# Patient Record
Sex: MALE | Race: WHITE | NOT HISPANIC OR LATINO | Employment: OTHER | URBAN - METROPOLITAN AREA
[De-identification: names, ages, dates, MRNs, and addresses within clinical notes are randomized per-mention and may not be internally consistent; named-entity substitution may affect disease eponyms.]

---

## 2017-02-07 ENCOUNTER — TELEPHONE (OUTPATIENT)
Dept: ORTHOPEDICS | Facility: CLINIC | Age: 67
End: 2017-02-07

## 2017-02-07 DIAGNOSIS — M54.50 LUMBAR SPINE PAIN: Primary | ICD-10-CM

## 2017-02-10 ENCOUNTER — TELEPHONE (OUTPATIENT)
Dept: ORTHOPEDICS | Facility: CLINIC | Age: 67
End: 2017-02-10

## 2017-02-10 NOTE — TELEPHONE ENCOUNTER
Left message regarding appt on 2/14/17 with Mia Mckeon PA-C. Pt should arrive on the 2nd floor at 1p for xrays, then up to floor 5 to see Mia following xrays. Phone number was provided for any further questions.

## 2017-02-14 ENCOUNTER — HOSPITAL ENCOUNTER (OUTPATIENT)
Dept: RADIOLOGY | Facility: HOSPITAL | Age: 67
Discharge: HOME | End: 2017-02-14
Attending: ORTHOPAEDIC SURGERY
Payer: MEDICARE

## 2017-02-14 ENCOUNTER — INITIAL CONSULT (OUTPATIENT)
Dept: ORTHOPEDICS | Facility: CLINIC | Age: 67
End: 2017-02-14
Payer: MEDICARE

## 2017-02-14 VITALS
HEART RATE: 74 BPM | HEIGHT: 70 IN | SYSTOLIC BLOOD PRESSURE: 102 MMHG | BODY MASS INDEX: 30.27 KG/M2 | WEIGHT: 211.44 LBS | DIASTOLIC BLOOD PRESSURE: 61 MMHG

## 2017-02-14 DIAGNOSIS — M51.36 DDD (DEGENERATIVE DISC DISEASE), LUMBAR: Primary | ICD-10-CM

## 2017-02-14 DIAGNOSIS — M54.50 LUMBAR SPINE PAIN: ICD-10-CM

## 2017-02-14 PROCEDURE — 1125F AMNT PAIN NOTED PAIN PRSNT: CPT | Mod: S$GLB,,, | Performed by: PHYSICIAN ASSISTANT

## 2017-02-14 PROCEDURE — 72120 X-RAY BEND ONLY L-S SPINE: CPT | Mod: 26,,, | Performed by: RADIOLOGY

## 2017-02-14 PROCEDURE — 3074F SYST BP LT 130 MM HG: CPT | Mod: S$GLB,,, | Performed by: PHYSICIAN ASSISTANT

## 2017-02-14 PROCEDURE — 99214 OFFICE O/P EST MOD 30 MIN: CPT | Mod: S$GLB,,, | Performed by: PHYSICIAN ASSISTANT

## 2017-02-14 PROCEDURE — 1160F RVW MEDS BY RX/DR IN RCRD: CPT | Mod: S$GLB,,, | Performed by: PHYSICIAN ASSISTANT

## 2017-02-14 PROCEDURE — 72100 X-RAY EXAM L-S SPINE 2/3 VWS: CPT | Mod: 26,,, | Performed by: RADIOLOGY

## 2017-02-14 PROCEDURE — 1157F ADVNC CARE PLAN IN RCRD: CPT | Mod: S$GLB,,, | Performed by: PHYSICIAN ASSISTANT

## 2017-02-14 PROCEDURE — 3078F DIAST BP <80 MM HG: CPT | Mod: S$GLB,,, | Performed by: PHYSICIAN ASSISTANT

## 2017-02-14 PROCEDURE — 99999 PR PBB SHADOW E&M-EST. PATIENT-LVL IV: CPT | Mod: PBBFAC,,, | Performed by: PHYSICIAN ASSISTANT

## 2017-02-14 PROCEDURE — 1159F MED LIST DOCD IN RCRD: CPT | Mod: S$GLB,,, | Performed by: PHYSICIAN ASSISTANT

## 2017-02-14 NOTE — MR AVS SNAPSHOT
[facility]  [address]  [facility] [address]  Phone: [phone]                  Moshe Murphy   2017 1:30 PM   Initial consult    Description:  Male : 1950   Provider:  Mia Mckeon PA-C   Department:  [facility] [place]           Reason for Visit     Lower Back - Pain           Diagnoses this Visit        Comments    DDD (degenerative disc disease), lumbar    -  Primary            To Do List           Goals (5 Years of Data)     None      [facility]     [facility] On [facility] Nurse Care Line -  Assistance  Registered nurses in the [facility] Center provide clinical advisement, health education, appointment booking, and other advisory services.  Call for this free service at [phone].             Medications           Message regarding Medications     Verify the changes and/or additions to your medication regime listed below are the same as discussed with your clinician today.  If any of these changes or additions are incorrect, please notify your healthcare provider.             Verify that the below list of medications is an accurate representation of the medications you are currently taking.  If none reported, the list may be blank. If incorrect, please contact your healthcare provider. Carry this list with you in case of emergency.           Current Medications     aspirin 81 MG Chew Take 81 mg by mouth once daily.    atorvastatin (LIPITOR) 10 MG tablet Take 10 mg by mouth once daily.    atorvastatin (LIPITOR) 20 MG tablet 20 mg once daily.     CRESTOR 10 mg tablet Take 10 mg by mouth once daily.    diazepam (VALIUM) 2 MG tablet Take 2 mg by mouth every 6 (six) hours as needed for Anxiety. Take 2 mg 1/2 hour before MRI  May repeat this dose twice      Disp. #3    levothyroxine (SYNTHROID) 50 MCG tablet Take 25 mcg by mouth once daily.    losartan (COZAAR) 100 MG tablet Take 100 mg by mouth once daily.    meloxicam (MOBIC) 15 MG tablet  "TAKE 1 TABLET BY MOUTH ONCE DAILY    NEXIUM 40 mg capsule     oxycodone-acetaminophen (PERCOCET) 5-325 mg per tablet Take 1 tablet by mouth every 4 (four) hours as needed for Pain.    pantoprazole (PROTONIX) 40 MG tablet Take 40 mg by mouth once daily.    testosterone cypionate (DEPOTESTOTERONE CYPIONATE) 200 mg/mL injection            Clinical Reference Information           Your Vitals Were     BP Pulse Height Weight BMI    102/61 74 5' 10" (1.778 m) 95.9 kg (211 lb 6.7 oz) 30.34 kg/m2      Blood Pressure          Most Recent Value    BP  102/61      Allergies as of 2/14/2017     No Known Allergies      Immunizations Administered on Date of Encounter - 2/14/2017     None      Orders Placed During Today's Visit     Future Labs/Procedures Expected by Expires    MRI Lumbar Spine Without Contrast  2/14/2017 2/14/2018      [facility] Sign-Up     Activating your [facility] account is as easy as 1-2-3!     1) Visit [email or web address], select Sign Up Now, enter this activation code and your date of birth, then select Next.  [email or web address]  Expires: 3/31/2017  1:35 PM      2) Create a username and password to use when you visit [facility] in the future and select a security question in case you lose your password and select Next.    3) Enter your e-mail address and click Sign Up!    Additional Information  If you have questions, please e-mail [email or web address] or call [phone] to talk to our [facility] staff. Remember, [facility] is NOT to be used for urgent needs. For medical emergencies, dial 911.         Language Assistance Services     ATTENTION: Language assistance services are available, free of charge. Please call [phone].      ATENCIÓN: Si habla shanthi, tiene a rivera disposición servicios gratuitos de asistencia lingüística. Llame al [phone].     CHÚ Ý: N?u b?n nói Ti?ng Vi?t, có các d?ch v? h? tr? ngôn ng? mi?n phí dành cho b?n. G?i s? [phone].         [facility] complies with " applicable Federal civil rights laws and does not discriminate on the basis of race, color, national origin, age, disability, or sex.

## 2017-02-14 NOTE — PROGRESS NOTES
DATE: 2/14/2017  PATIENT: Moshe Murphy    Supervising Physician: Moi Lobo M.D.    CHIEF COMPLAINT: back pain    HISTORY:  Moshe Murphy is a 66 y.o. 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 The pain has been present for about 3 years but has progressively worsened over the last month. The patient describes the pain as tight.  The pain is worse with walking and sitting and improved by laying down. There is no associated numbness and tingling. There is no subjective weakness. Prior treatments have included mobic, muscle relaxants, percocet and home exercises, but no ESIs or surgery.    The patient denies myelopathic symptoms such as handwriting changes or difficulty with buttons/coins/keys. Denies perineal paresthesias, bowel/bladder dysfunction.    PAST MEDICAL/SURGICAL HISTORY:  Past Medical History   Diagnosis Date    Acid reflux     Hyperlipidemia     Hypertension      Past Surgical History   Procedure Laterality Date    Stents      Elbow surgery      Shoulder surgery      Knee arthroscopy         Medications:   Current Outpatient Prescriptions on File Prior to Visit   Medication Sig Dispense Refill    aspirin 81 MG Chew Take 81 mg by mouth once daily.      atorvastatin (LIPITOR) 10 MG tablet Take 10 mg by mouth once daily.      atorvastatin (LIPITOR) 20 MG tablet 20 mg once daily.       CRESTOR 10 mg tablet Take 10 mg by mouth once daily.  3    diazepam (VALIUM) 2 MG tablet Take 2 mg by mouth every 6 (six) hours as needed for Anxiety. Take 2 mg 1/2 hour before MRI  May repeat this dose twice      Disp. #3      levothyroxine (SYNTHROID) 50 MCG tablet Take 25 mcg by mouth once daily.  1    losartan (COZAAR) 100 MG tablet Take 100 mg by mouth once daily.  6    meloxicam (MOBIC) 15 MG tablet TAKE 1 TABLET BY MOUTH ONCE DAILY 90 tablet 2    NEXIUM 40 mg capsule       oxycodone-acetaminophen (PERCOCET)  "5-325 mg per tablet Take 1 tablet by mouth every 4 (four) hours as needed for Pain. 20 tablet 0    pantoprazole (PROTONIX) 40 MG tablet Take 40 mg by mouth once daily.      testosterone cypionate (DEPOTESTOTERONE CYPIONATE) 200 mg/mL injection   3     No current facility-administered medications on file prior to visit.        Social History:   Social History     Social History    Marital status:      Spouse name: N/A    Number of children: N/A    Years of education: N/A     Occupational History    Not on file.     Social History Main Topics    Smoking status: Never Smoker    Smokeless tobacco: Never Used    Alcohol use Yes      Comment: socially    Drug use: No    Sexual activity: Yes     Partners: Female     Other Topics Concern    Not on file     Social History Narrative       REVIEW OF SYSTEMS:  Constitution: Negative. Negative for chills, fever and night sweats.   Cardiovascular: Negative for chest pain and syncope.   Respiratory: Negative for cough and shortness of breath.   Gastrointestinal: See HPI. Negative for nausea/vomiting. Negative for abdominal pain.  Genitourinary: See HPI. Negative for discoloration or dysuria.  Skin: Negative for dry skin, itching and rash.   Hematologic/Lymphatic: Negative for bleeding problem. Does not bruise/bleed easily.   Musculoskeletal: Negative for falls and muscle weakness.   Neurological: See HPI. No seizures.   Endocrine: Negative for polydipsia, polyphagia and polyuria.   Allergic/Immunologic: Negative for hives and persistent infections.     EXAM:  Visit Vitals    /61    Pulse 74    Ht 5' 10" (1.778 m)    Wt 95.9 kg (211 lb 6.7 oz)    BMI 30.34 kg/m2       General: The patient is a very pleasant 66 y.o. male in no apparent distress, the patient is oriented to person, place and time.  Psych: Normal mood and affect  HEENT: Vision grossly intact, hearing intact to the spoken word.  Lungs: Respirations unlabored.  Gait: Normal station and gait, " no difficulty with toe or heel walk.   Skin: Dorsal lumbar skin negative for rashes, lesions, hairy patches and surgical scars. There is mild lumbar tenderness to palpation.  Range of motion: Lumbar range of motion is acceptable.  Spinal Balance: Global saggital and coronal spinal balance acceptable, not significant for scoliosis and kyphosis.  Musculoskeletal: No pain with the range of motion of the bilateral hips. No trochanteric tenderness to palpation.  Vascular: Bilateral lower extremities warm and well perfused, dorsalis pedis pulses 2+ bilaterally.  Neurological: Normal strength and tone in all major motor groups in the bilateral lower extremities. Normal sensation to light touch in the L2-S1 dermatomes bilaterally.  Deep tendon reflexes symmetric 1+ in the bilateral lower extremities.  Negative Babinski bilaterally. Straight leg raise positive bilaterally, reproduces back pain.    IMAGING:      Today I personally reviewed AP, Lat and Flex/Ex  upright L-spine films that demonstrate mild to moderate disc space narrowing at L4/5 and L5/S1.     ASSESSMENT/PLAN:    Moshe was seen today for pain.    Diagnoses and all orders for this visit:    DDD (degenerative disc disease), lumbar  -     MRI Lumbar Spine Without Contrast; Future    The patient is having back and buttock pain that has failed to respond to conservative treatment including medications and exercises.  MRI lumbar spine for further evaluation.  Follow up after the MRI to discuss results and further treatment including ESIs and possibly surgery.       Return if symptoms worsen or fail to improve.

## 2017-02-20 ENCOUNTER — HOSPITAL ENCOUNTER (OUTPATIENT)
Dept: RADIOLOGY | Facility: HOSPITAL | Age: 67
Discharge: HOME | End: 2017-02-20
Attending: ORTHOPAEDIC SURGERY
Payer: MEDICARE

## 2017-02-20 DIAGNOSIS — M51.36 DDD (DEGENERATIVE DISC DISEASE), LUMBAR: ICD-10-CM

## 2017-02-24 ENCOUNTER — HOSPITAL ENCOUNTER (OUTPATIENT)
Dept: RADIOLOGY | Facility: HOSPITAL | Age: 67
Discharge: HOME | End: 2017-02-24
Attending: ORTHOPAEDIC SURGERY
Payer: MEDICARE

## 2017-02-24 PROCEDURE — 72148 MRI LUMBAR SPINE W/O DYE: CPT | Mod: 26,,, | Performed by: RADIOLOGY

## 2017-02-24 PROCEDURE — 63600175 PHARM REV CODE 636 W HCPCS: Performed by: STUDENT IN AN ORGANIZED HEALTH CARE EDUCATION/TRAINING PROGRAM

## 2017-02-24 PROCEDURE — 72148 MRI LUMBAR SPINE W/O DYE: CPT | Mod: TC

## 2017-02-24 RX ORDER — MIDAZOLAM HYDROCHLORIDE 1 MG/ML
2 INJECTION INTRAMUSCULAR; INTRAVENOUS ONCE
Status: COMPLETED | OUTPATIENT
Start: 2017-02-24 | End: 2017-02-24

## 2017-02-24 RX ADMIN — MIDAZOLAM HYDROCHLORIDE 2 MG: 1 INJECTION, SOLUTION INTRAMUSCULAR; INTRAVENOUS at 12:02

## 2017-02-24 NOTE — PROGRESS NOTES
2 mg versed given prior to MRI start for anxiolysis.  Patient denies allergies.  Patient verified ride home with wife.  Care of patient left in hands of MRI staff.

## 2017-03-08 ENCOUNTER — OFFICE VISIT (OUTPATIENT)
Dept: ORTHOPEDICS | Facility: CLINIC | Age: 67
End: 2017-03-08
Payer: MEDICARE

## 2017-03-08 VITALS
DIASTOLIC BLOOD PRESSURE: 73 MMHG | WEIGHT: 214.31 LBS | HEIGHT: 70 IN | BODY MASS INDEX: 30.68 KG/M2 | SYSTOLIC BLOOD PRESSURE: 125 MMHG | HEART RATE: 74 BPM

## 2017-03-08 DIAGNOSIS — M48.00 SPINAL STENOSIS, UNSPECIFIED SPINAL REGION: Primary | ICD-10-CM

## 2017-03-08 PROCEDURE — 1160F RVW MEDS BY RX/DR IN RCRD: CPT | Mod: S$GLB,,, | Performed by: PHYSICIAN ASSISTANT

## 2017-03-08 PROCEDURE — 1125F AMNT PAIN NOTED PAIN PRSNT: CPT | Mod: S$GLB,,, | Performed by: PHYSICIAN ASSISTANT

## 2017-03-08 PROCEDURE — 3074F SYST BP LT 130 MM HG: CPT | Mod: S$GLB,,, | Performed by: PHYSICIAN ASSISTANT

## 2017-03-08 PROCEDURE — 99999 PR PBB SHADOW E&M-EST. PATIENT-LVL III: CPT | Mod: PBBFAC,,, | Performed by: PHYSICIAN ASSISTANT

## 2017-03-08 PROCEDURE — 99213 OFFICE O/P EST LOW 20 MIN: CPT | Mod: S$GLB,,, | Performed by: PHYSICIAN ASSISTANT

## 2017-03-08 PROCEDURE — 1157F ADVNC CARE PLAN IN RCRD: CPT | Mod: S$GLB,,, | Performed by: PHYSICIAN ASSISTANT

## 2017-03-08 PROCEDURE — 1159F MED LIST DOCD IN RCRD: CPT | Mod: S$GLB,,, | Performed by: PHYSICIAN ASSISTANT

## 2017-03-08 PROCEDURE — 3078F DIAST BP <80 MM HG: CPT | Mod: S$GLB,,, | Performed by: PHYSICIAN ASSISTANT

## 2017-03-08 RX ORDER — AZITHROMYCIN 250 MG/1
TABLET, FILM COATED ORAL
Refills: 0 | COMMUNITY
Start: 2016-12-28 | End: 2019-06-11

## 2017-03-08 NOTE — PROGRESS NOTES
"DATE: 3/8/2017  PATIENT: Moshe Murphy    Attending Physician: Moi Lobo M.D.    HISTORY:  Moshe Murphy is a 66 y.o. male who returns to me today for MRI results.  He was last seen by me 2/14/2017.  Today he is doing well but notes continued back and buttock pain.    The Patient denies myelopathic symptoms such as handwriting changes or difficulty with buttons/coins/keys. Denies perineal paresthesias, bowel/bladder dysfunction.    PMH/PSH/FamHx/SocHx:  Unchanged from prior visit    ROS:  REVIEW OF SYSTEMS:  Constitution: Negative. Negative for chills, fever and night sweats.   HENT: Negative for congestion and headaches.    Eyes: Negative for blurred vision, left vision loss and right vision loss.   Cardiovascular: Negative for chest pain and syncope.   Respiratory: Negative for cough and shortness of breath.    Endocrine: Negative for polydipsia, polyphagia and polyuria.   Hematologic/Lymphatic: Negative for bleeding problem. Does not bruise/bleed easily.   Skin: Negative for dry skin, itching and rash.   Musculoskeletal: Negative for falls and muscle weakness.   Gastrointestinal: Negative for abdominal pain and bowel incontinence.   Allergic/Immunologic: Negative for hives and persistent infections.  Genitourinary: Negative for urinary retention/incontinence and nocturia.   Neurological: Negative for disturbances in coordination, no myelopathic symptoms such as handwriting changes or difficulty with buttons, coins, keys or small objects. No loss of balance and seizures.   Psychiatric/Behavioral: Negative for depression. The patient does not have insomnia.   Denies perineal paresthesias, bowel or bladder incontinence    EXAM:  /73 (BP Location: Right arm, Patient Position: Sitting, BP Method: Automatic)  Pulse 74  Ht 5' 10" (1.778 m)  Wt 97.2 kg (214 lb 4.6 oz)  BMI 30.75 kg/m2    My physical examination was notable for the following findings:     Normal station and gait, no " difficulty with toe or heel walk.   Dorsal lumbar skin negative for rashes, lesions, hairy patches and surgical scars. There is mild lumbar tenderness to palpation.  Lumbar range of motion is acceptable.  Global saggital and coronal spinal balance acceptable, not significant for scoliosis and kyphosis.  No pain with the range of motion of the bilateral hips. No trochanteric tenderness to palpation.  Bilateral lower extremities warm and well perfused, dorsalis pedis pulses 2+ bilaterally.  Normal strength and tone in all major motor groups in the bilateral lower extremities. Normal sensation to light touch in the L2-S1 dermatomes bilaterally.  Deep tendon reflexes symmetric 1+ in the bilateral lower extremities.  Negative Babinski bilaterally. Straight leg raise positive bilaterally, reproduces back pain.      IMAGING:  No new imaging today.    Today I personally re- reviewed AP, Lat and Flex/Ex  upright L-spine that demonstrate mild to moderate disc space narrowing at L4/5 and L5/S1.     MRI lumbar spine demonstrates a broad based disc bulge with moderate spinal stenosis at L3/4, L4/5 and L5/S1.     ASSESSMENT/PLAN:    Diagnoses and all orders for this visit:    Spinal stenosis, unspecified spinal region  -     Procedure Order to [facility]; Future      The patient would like to try an injection.  Plan for caudal TFESI.  Follow up after injection if symptoms persist.    Return if symptoms worsen or fail to improve.

## 2017-03-31 ENCOUNTER — HOSPITAL ENCOUNTER (OUTPATIENT)
Facility: OTHER | Age: 67
Discharge: HOME | End: 2017-03-31
Attending: ANESTHESIOLOGY | Admitting: ANESTHESIOLOGY
Payer: MEDICARE

## 2017-03-31 ENCOUNTER — SURGERY (OUTPATIENT)
Age: 67
End: 2017-03-31

## 2017-03-31 VITALS
OXYGEN SATURATION: 98 % | HEART RATE: 56 BPM | SYSTOLIC BLOOD PRESSURE: 128 MMHG | RESPIRATION RATE: 17 BRPM | TEMPERATURE: 98 F | BODY MASS INDEX: 30.78 KG/M2 | HEIGHT: 70 IN | WEIGHT: 215 LBS | DIASTOLIC BLOOD PRESSURE: 75 MMHG

## 2017-03-31 DIAGNOSIS — M54.17 LUMBOSACRAL RADICULOPATHY: ICD-10-CM

## 2017-03-31 DIAGNOSIS — G89.29 CHRONIC PAIN: ICD-10-CM

## 2017-03-31 DIAGNOSIS — M51.36 DDD (DEGENERATIVE DISC DISEASE), LUMBAR: Primary | ICD-10-CM

## 2017-03-31 PROCEDURE — 62323 NJX INTERLAMINAR LMBR/SAC: CPT | Performed by: ANESTHESIOLOGY

## 2017-03-31 PROCEDURE — 62322 NJX INTERLAMINAR LMBR/SAC: CPT | Performed by: ANESTHESIOLOGY

## 2017-03-31 PROCEDURE — 62327 NJX INTERLAMINAR LMBR/SAC: CPT | Mod: ,,, | Performed by: ANESTHESIOLOGY

## 2017-03-31 PROCEDURE — 63600175 PHARM REV CODE 636 W HCPCS: Performed by: ANESTHESIOLOGY

## 2017-03-31 PROCEDURE — 77003 FLUOROGUIDE FOR SPINE INJECT: CPT | Performed by: ANESTHESIOLOGY

## 2017-03-31 PROCEDURE — 25000003 PHARM REV CODE 250: Performed by: ANESTHESIOLOGY

## 2017-03-31 PROCEDURE — 25500020 PHARM REV CODE 255: Performed by: ANESTHESIOLOGY

## 2017-03-31 PROCEDURE — 99152 MOD SED SAME PHYS/QHP 5/>YRS: CPT | Mod: ,,, | Performed by: ANESTHESIOLOGY

## 2017-03-31 RX ORDER — FENTANYL CITRATE 50 UG/ML
INJECTION, SOLUTION INTRAMUSCULAR; INTRAVENOUS
Status: DISCONTINUED | OUTPATIENT
Start: 2017-03-31 | End: 2017-03-31 | Stop reason: HOSPADM

## 2017-03-31 RX ORDER — MIDAZOLAM HYDROCHLORIDE 1 MG/ML
INJECTION INTRAMUSCULAR; INTRAVENOUS
Status: DISCONTINUED | OUTPATIENT
Start: 2017-03-31 | End: 2017-03-31 | Stop reason: HOSPADM

## 2017-03-31 RX ORDER — LIDOCAINE HYDROCHLORIDE 10 MG/ML
INJECTION INFILTRATION; PERINEURAL
Status: DISCONTINUED | OUTPATIENT
Start: 2017-03-31 | End: 2017-03-31 | Stop reason: HOSPADM

## 2017-03-31 RX ORDER — SODIUM CHLORIDE 9 MG/ML
500 INJECTION, SOLUTION INTRAVENOUS CONTINUOUS
Status: DISCONTINUED | OUTPATIENT
Start: 2017-03-31 | End: 2017-03-31 | Stop reason: HOSPADM

## 2017-03-31 RX ORDER — LIDOCAINE HYDROCHLORIDE 5 MG/ML
INJECTION, SOLUTION INFILTRATION; INTRAVENOUS
Status: DISCONTINUED | OUTPATIENT
Start: 2017-03-31 | End: 2017-03-31 | Stop reason: HOSPADM

## 2017-03-31 RX ORDER — TRIAMCINOLONE ACETONIDE 40 MG/ML
INJECTION, SUSPENSION INTRA-ARTICULAR; INTRAMUSCULAR
Status: DISCONTINUED | OUTPATIENT
Start: 2017-03-31 | End: 2017-03-31 | Stop reason: HOSPADM

## 2017-03-31 RX ADMIN — LIDOCAINE HYDROCHLORIDE 10 ML: 10 INJECTION, SOLUTION INFILTRATION; PERINEURAL at 09:03

## 2017-03-31 RX ADMIN — FENTANYL CITRATE 50 MCG: 50 INJECTION, SOLUTION INTRAMUSCULAR; INTRAVENOUS at 09:03

## 2017-03-31 RX ADMIN — TRIAMCINOLONE ACETONIDE 40 MG: 40 INJECTION, SUSPENSION INTRA-ARTICULAR; INTRAMUSCULAR at 09:03

## 2017-03-31 RX ADMIN — IOHEXOL 5 ML: 300 INJECTION, SOLUTION INTRAVENOUS at 09:03

## 2017-03-31 RX ADMIN — SODIUM CHLORIDE 500 ML: 0.9 INJECTION, SOLUTION INTRAVENOUS at 08:03

## 2017-03-31 RX ADMIN — MIDAZOLAM HYDROCHLORIDE 1 MG: 1 INJECTION, SOLUTION INTRAMUSCULAR; INTRAVENOUS at 09:03

## 2017-03-31 RX ADMIN — LIDOCAINE HYDROCHLORIDE 10 ML: 5 INJECTION, SOLUTION INFILTRATION; INTRAVENOUS at 09:03

## 2017-03-31 NOTE — OP NOTE
Patient Name: Moshe Murphy  MRN: [id number]    INFORMED CONSENT: The procedure, risks, benefits and options were discussed with patient. There are no contraindications to the procedure. The patient expressed understanding and agreed to proceed. The personnel performing the procedure was discussed. I verify that I personally obtained Moshe's consent prior to the start of the procedure and the signed consent can be found on the patient's chart.    Procedure Date: 03/31/2017    Anesthesia: Topical    Pre Procedure diagnosis:   1. DDD (degenerative disc disease), lumbar    2. Lumbosacral radiculopathy    3. Chronic pain      Post-Procedure diagnosis: same      Moderate Sedation: Yes - Fentanyl 50 mcg and Midazolam 2 mg    PROCEDURE: CAUDAL HAYLIE with Racz Catheter      DESCRIPTION OF PROCEDURE: After fully informed written consent was obtained, the patient was brought to the procedure room and placed in the prone position. Monitoring of pulse oximetry, heart rate, and blood pressure was done pre-procedure, during the procedure, and post-procedure. The skin was prepped with chlorhexidine three times and draped in a sterile fashion.  After performing time out. With a 25-gauge 1.5  inch needle, 4 cc of lidocaine 1% was injected subcutaneously over the entry site. The sacrum and sacral cornua were visualized in an AP view.  An 16 gauge 31/2 inch Tuohy needle was inserted and advanced into the sacral hiatus under fluoroscopic guidance. After the needle passed through the sacrococcygeal ligament, the needle angle was lowered and the needle was advanced . The needle position was confirmed using AP and lateral fluoroscopic imaging. There was no evidence of paresthesias throughout needle placement. A radiopaque 20 G Flextip catheter  was introduced through the needle and directed to the L5 level, under fluoroscopic guidance. The stylette was removed and aspiration was negative for blood or CSF. 3 ml of Omnipaque 300 contrast  agent was slowly injected. Confirmation of spread of contrast agent within the epidural space was made with fluoroscopic imaging in the AP and lateral views. Subsequently, 10 mL of lidocaine 0.5% and 40 mg Kenalog was slowly administered without resistance. There was no pain on injection. Contrast spread was noted from S2 to L5. The needle was removed and bleeding was nil . The patient tolerated the procedure well and there was no evidence of procedural complications. A sterile dressing was applied. No  specimens collected. Moshe was taken back to the recovery room for further observation.     Blood Loss: Nill  Specimen: None    Ryan James MD

## 2017-03-31 NOTE — IP AVS SNAPSHOT
[facility] Location ([facility])  [address] [place], [place] [id number]  Phone: [phone]           Patient Discharge Instructions   Our goal is to set you up for success. This packet includes information on your condition, medications, and your home care.  It will help you care for yourself to prevent having to return to the hospital.     Please ask your nurse if you have any questions.      There are many details to remember when preparing to leave the hospital. Here is what you will need to do:    1. Take your medicine. If you are prescribed medications, review your Medication List on the following pages. You may have new medications to  at the pharmacy and others that you'll need to stop taking. Review the instructions for how and when to take your medications. Talk with your doctor or nurses if you are unsure of what to do.     2. Go to your follow-up appointments. Specific follow-up information is listed in the following pages. Your may be contacted by a nurse or clinical provider about future appointments. Be sure we have all of the phone numbers to reach you. Please contact your provider's office if you are unable to make an appointment.     3. Watch for warning signs. Your doctor or nurse will give you detailed warning signs to watch for and when to call for assistance. These instructions may also include educational information about your condition. If you experience any of warning signs to your health, call your doctor.           [facility]  Unless otherwise directed by your provider, please   contact [facility], our nurse care line   that is available for 24/7 assistance.     [phone] (toll-free)     Registered nurses in the [facility]   provide: appointment scheduling, clinical advisement, health education, and other advisory services.                  ** Verify the list of medication(s) below is accurate and up to date. Carry this with you in case of  emergency. If your medications have changed, please notify your healthcare provider.             Medication List      ASK your doctor about these medications        Additional Info                      aspirin 81 MG Chew   Refills:  0   Dose:  81 mg    Instructions:  Take 81 mg by mouth once daily.     Begin Date    AM    Noon    PM    Bedtime       * atorvastatin 10 MG tablet   Commonly known as:  LIPITOR   Refills:  0   Dose:  10 mg    Instructions:  Take 10 mg by mouth once daily.     Begin Date    AM    Noon    PM    Bedtime       * atorvastatin 20 MG tablet   Commonly known as:  LIPITOR   Refills:  0   Dose:  20 mg    Instructions:  20 mg once daily.     Begin Date    AM    Noon    PM    Bedtime       azithromycin 250 MG tablet   Commonly known as:  Z-SHERRY   Refills:  0      Begin Date    AM    Noon    PM    Bedtime       CRESTOR 10 MG tablet   Refills:  3   Dose:  10 mg   Generic drug:  rosuvastatin    Instructions:  Take 10 mg by mouth once daily.     Begin Date    AM    Noon    PM    Bedtime       diazePAM 2 MG tablet   Commonly known as:  VALIUM   Refills:  0   Dose:  2 mg    Instructions:  Take 2 mg by mouth every 6 (six) hours as needed for Anxiety. Take 2 mg 1/2 hour before MRI  May repeat this dose twice      Disp. #3     Begin Date    AM    Noon    PM    Bedtime       levothyroxine 50 MCG tablet   Commonly known as:  SYNTHROID   Refills:  1   Dose:  25 mcg    Instructions:  Take 25 mcg by mouth once daily.     Begin Date    AM    Noon    PM    Bedtime       losartan 100 MG tablet   Commonly known as:  COZAAR   Refills:  6   Dose:  100 mg    Instructions:  Take 100 mg by mouth once daily.     Begin Date    AM    Noon    PM    Bedtime       meloxicam 15 MG tablet   Commonly known as:  MOBIC   Quantity:  90 tablet   Refills:  2   Comments:  **Patient requests 90 days supply**    Instructions:  TAKE 1 TABLET BY MOUTH ONCE DAILY     Begin Date    AM    Noon    PM    Bedtime       NEXIUM 40 MG capsule    Refills:  0   Generic drug:  esomeprazole      Begin Date    AM    Noon    PM    Bedtime       oxycodone-acetaminophen 5-325 mg per tablet   Commonly known as:  PERCOCET   Quantity:  20 tablet   Refills:  0   Dose:  1 tablet    Instructions:  Take 1 tablet by mouth every 4 (four) hours as needed for Pain.     Begin Date    AM    Noon    PM    Bedtime       pantoprazole 40 MG tablet   Commonly known as:  PROTONIX   Refills:  0   Dose:  40 mg    Instructions:  Take 40 mg by mouth once daily.     Begin Date    AM    Noon    PM    Bedtime       testosterone cypionate 200 mg/mL injection   Commonly known as:  DEPOTESTOTERONE CYPIONATE   Refills:  3      Begin Date    AM    Noon    PM    Bedtime       * Notice:  This list has 2 medication(s) that are the same as other medications prescribed for you. Read the directions carefully, and ask your doctor or other care provider to review them with you.               Please bring to all follow up appointments:    1. A copy of your discharge instructions.  2. All medicines you are currently taking in their original bottles.  3. Identification and insurance card.    Please arrive 15 minutes ahead of scheduled appointment time.    Please call 24 hours in advance if you must reschedule your appointment and/or time.            Discharge Instructions       Home Care Instructions Pain Management:    1. DIET:   You may resume your normal diet today.   2. BATHING:   You may shower with luke warm water. No soaking in tub.  3. DRESSING:   You may remove your bandage today.   4. ACTIVITY LEVEL:   You may resume your normal activities 24 hrs after your procedure.  5. MEDICATIONS:   You may resume your normal medications today.   6. SPECIAL INSTRUCTIONS:   No heat to the injection site for 24 hrs including, bath or shower, heating pad, moist heat, or hot tubs.    Use ice pack to injection site for any pain or discomfort.  Apply ice packs for 20 minute intervals as needed.   If you have  "received any sedatives by mouth today you may not drive for 12 hours.    If you have received any sedation through your IV, you may not drive for 24 hrs.     PLEASE CALL YOUR DOCTOR IF:  1. Redness or swelling around the injection site.  2. Fever of 101 degrees  3. Drainage (pus) from the injection site.  4. For any continuous bleeding (some dried blood over the incision is normal.)  5. For severe headache that is relieved when lying flat.    FOR EMERGENCIES:   If any unusual problems or difficulties occur during clinic hours, call [phone] or 487.         Admission Information     Date & Time Provider Department CSN    3/31/2017  7:41 AM Ryan James MD [facility] [id number]      Care Providers     Provider Role Specialty Primary office phone    Ryan James MD Attending Provider Pain Medicine [phone]    Ryan James MD Surgeon  Pain Medicine [phone]      Your Vitals Were     BP Pulse Temp Resp Height Weight    127/73 (BP Location: Right arm, Patient Position: Sitting, BP Method: Automatic) 55 97.7 °F (36.5 °C) (Oral) 17 5' 10" (1.778 m) 97.5 kg (215 lb)    SpO2 BMI             96% 30.85 kg/m2         Recent Lab Values     No lab values to display.      Allergies as of 3/31/2017     No Known Allergies      Advance Directives     An advance directive is a document which, in the event you are no longer able to make decisions for yourself, tells your healthcare team what kind of treatment you do or do not want to receive, or who you would like to make those decisions for you.  If you do not currently have an advance directive, [facility] encourages you to create one.  For more information call:  [phone]), [phone] ([phone]),  or log on to [email or web address].        Language Assistance Services     ATTENTION: Language assistance services are available, free of charge. Please call [phone].      ATENCIÓN: Si amanda brady " disposición servicios gratuitos de asistencia lingüística. Majo al [phone].     [facility] Ý: N?u b?n nói Ti?ng Vi?t, có các d?ch v? h? tr? ngôn ng? mi?n phí dành cho b?n. G?i s? [phone].        [facility] Sign-Up     Activating your [facility] account is as easy as 1-2-3!     1) Visit [email or web address], select Sign Up Now, enter this activation code and your date of birth, then select Next.  [email or web address]  Expires: 3/31/2017  2:35 PM      2) Create a username and password to use when you visit [facility] in the future and select a security question in case you lose your password and select Next.    3) Enter your e-mail address and click Sign Up!    Additional Information  If you have questions, please e-mail [email or web address] or call [phone] to talk to our [facility] staff. Remember, [facility] is NOT to be used for urgent needs. For medical emergencies, dial 911.          [facility] complies with applicable Federal civil rights laws and does not discriminate on the basis of race, color, national origin, age, disability, or sex.

## 2017-03-31 NOTE — DISCHARGE SUMMARY
Discharge Note  Short Stay      SUMMARY     Admit Date: 3/31/2017    Attending Physician: Ryan James      Discharge Physician: Ryan James      Discharge Date: 3/31/2017 9:18 AM    Final Diagnosis:   1. DDD (degenerative disc disease), lumbar    2. Lumbosacral radiculopathy    3. Chronic pain        Disposition: Home or self care    Patient Instructions:   Current Discharge Medication List      CONTINUE these medications which have NOT CHANGED    Details   aspirin 81 MG Chew Take 81 mg by mouth once daily.      !! atorvastatin (LIPITOR) 10 MG tablet Take 10 mg by mouth once daily.      !! atorvastatin (LIPITOR) 20 MG tablet 20 mg once daily.       CRESTOR 10 mg tablet Take 10 mg by mouth once daily.  Refills: 3      levothyroxine (SYNTHROID) 50 MCG tablet Take 25 mcg by mouth once daily.  Refills: 1      losartan (COZAAR) 100 MG tablet Take 100 mg by mouth once daily.  Refills: 6      NEXIUM 40 mg capsule       pantoprazole (PROTONIX) 40 MG tablet Take 40 mg by mouth once daily.      azithromycin (Z-SHERRY) 250 MG tablet Refills: 0      diazepam (VALIUM) 2 MG tablet Take 2 mg by mouth every 6 (six) hours as needed for Anxiety. Take 2 mg 1/2 hour before MRI  May repeat this dose twice      Disp. #3      meloxicam (MOBIC) 15 MG tablet TAKE 1 TABLET BY MOUTH ONCE DAILY  Qty: 90 tablet, Refills: 2    Comments: **Patient requests 90 days supply**      oxycodone-acetaminophen (PERCOCET) 5-325 mg per tablet Take 1 tablet by mouth every 4 (four) hours as needed for Pain.  Qty: 20 tablet, Refills: 0      testosterone cypionate (DEPOTESTOTERONE CYPIONATE) 200 mg/mL injection Refills: 3       !! - Potential duplicate medications found. Please discuss with provider.              Discharge Diagnosis: Same as Pre and Post Procedure  Condition on Discharge: Stable.  Diet on Discharge: Same as before.  Activity: as per instruction sheet.  Discharge to: Home with a responsible adult.  Follow up: as per Discharge  instructions.

## 2017-03-31 NOTE — H&P (VIEW-ONLY)
"DATE: 3/8/2017  PATIENT: Moshe Murphy    Attending Physician: Moi Lobo M.D.    HISTORY:  Moshe Murphy is a 66 y.o. male who returns to me today for MRI results.  He was last seen by me 2/14/2017.  Today he is doing well but notes continued back and buttock pain.    The Patient denies myelopathic symptoms such as handwriting changes or difficulty with buttons/coins/keys. Denies perineal paresthesias, bowel/bladder dysfunction.    PMH/PSH/FamHx/SocHx:  Unchanged from prior visit    ROS:  REVIEW OF SYSTEMS:  Constitution: Negative. Negative for chills, fever and night sweats.   HENT: Negative for congestion and headaches.    Eyes: Negative for blurred vision, left vision loss and right vision loss.   Cardiovascular: Negative for chest pain and syncope.   Respiratory: Negative for cough and shortness of breath.    Endocrine: Negative for polydipsia, polyphagia and polyuria.   Hematologic/Lymphatic: Negative for bleeding problem. Does not bruise/bleed easily.   Skin: Negative for dry skin, itching and rash.   Musculoskeletal: Negative for falls and muscle weakness.   Gastrointestinal: Negative for abdominal pain and bowel incontinence.   Allergic/Immunologic: Negative for hives and persistent infections.  Genitourinary: Negative for urinary retention/incontinence and nocturia.   Neurological: Negative for disturbances in coordination, no myelopathic symptoms such as handwriting changes or difficulty with buttons, coins, keys or small objects. No loss of balance and seizures.   Psychiatric/Behavioral: Negative for depression. The patient does not have insomnia.   Denies perineal paresthesias, bowel or bladder incontinence    EXAM:  /73 (BP Location: Right arm, Patient Position: Sitting, BP Method: Automatic)  Pulse 74  Ht 5' 10" (1.778 m)  Wt 97.2 kg (214 lb 4.6 oz)  BMI 30.75 kg/m2    My physical examination was notable for the following findings:     Normal station and gait, no " difficulty with toe or heel walk.   Dorsal lumbar skin negative for rashes, lesions, hairy patches and surgical scars. There is mild lumbar tenderness to palpation.  Lumbar range of motion is acceptable.  Global saggital and coronal spinal balance acceptable, not significant for scoliosis and kyphosis.  No pain with the range of motion of the bilateral hips. No trochanteric tenderness to palpation.  Bilateral lower extremities warm and well perfused, dorsalis pedis pulses 2+ bilaterally.  Normal strength and tone in all major motor groups in the bilateral lower extremities. Normal sensation to light touch in the L2-S1 dermatomes bilaterally.  Deep tendon reflexes symmetric 1+ in the bilateral lower extremities.  Negative Babinski bilaterally. Straight leg raise positive bilaterally, reproduces back pain.      IMAGING:  No new imaging today.    Today I personally re- reviewed AP, Lat and Flex/Ex  upright L-spine that demonstrate mild to moderate disc space narrowing at L4/5 and L5/S1.     MRI lumbar spine demonstrates a broad based disc bulge with moderate spinal stenosis at L3/4, L4/5 and L5/S1.     ASSESSMENT/PLAN:    Diagnoses and all orders for this visit:    Spinal stenosis, unspecified spinal region  -     Procedure Order to [facility]; Future      The patient would like to try an injection.  Plan for caudal TFESI.  Follow up after injection if symptoms persist.    Return if symptoms worsen or fail to improve.

## 2017-03-31 NOTE — DISCHARGE INSTRUCTIONS

## 2017-04-18 ENCOUNTER — OFFICE VISIT (OUTPATIENT)
Dept: ORTHOPEDICS | Facility: CLINIC | Age: 67
End: 2017-04-18
Payer: MEDICARE

## 2017-04-18 VITALS — HEIGHT: 70 IN | BODY MASS INDEX: 30.73 KG/M2 | WEIGHT: 214.63 LBS

## 2017-04-18 DIAGNOSIS — M16.11 PRIMARY OSTEOARTHRITIS OF RIGHT HIP: Primary | ICD-10-CM

## 2017-04-18 DIAGNOSIS — M48.00 SPINAL STENOSIS, UNSPECIFIED SPINAL REGION: ICD-10-CM

## 2017-04-18 PROCEDURE — 1125F AMNT PAIN NOTED PAIN PRSNT: CPT | Mod: S$GLB,,, | Performed by: PHYSICIAN ASSISTANT

## 2017-04-18 PROCEDURE — 99999 PR PBB SHADOW E&M-EST. PATIENT-LVL III: CPT | Mod: PBBFAC,,, | Performed by: PHYSICIAN ASSISTANT

## 2017-04-18 PROCEDURE — 1159F MED LIST DOCD IN RCRD: CPT | Mod: S$GLB,,, | Performed by: PHYSICIAN ASSISTANT

## 2017-04-18 PROCEDURE — 1160F RVW MEDS BY RX/DR IN RCRD: CPT | Mod: S$GLB,,, | Performed by: PHYSICIAN ASSISTANT

## 2017-04-18 PROCEDURE — 99213 OFFICE O/P EST LOW 20 MIN: CPT | Mod: S$GLB,,, | Performed by: PHYSICIAN ASSISTANT

## 2017-04-18 PROCEDURE — 1157F ADVNC CARE PLAN IN RCRD: CPT | Mod: 8P,S$GLB,, | Performed by: PHYSICIAN ASSISTANT

## 2017-04-18 NOTE — PROGRESS NOTES
"DATE: 4/18/2017  PATIENT: Moshe Murphy    Attending Physician: Moi Lobo M.D.    HISTORY:  Moshe Murphy is a 66 y.o. male who returns to me today for follow up after having an HAYLIE 3/31/2017.  He was last seen by me 3/8/2017.  Today he is doing well but notes the injection gave great relief of his back pain but now he has right groin pain.  He was seen by [facility] for his hip in September 2016.  He says he is ready for an injection in the hip.     The Patient denies myelopathic symptoms such as handwriting changes or difficulty with buttons/coins/keys. Denies perineal paresthesias, bowel/bladder dysfunction.    PMH/PSH/FamHx/SocHx:  Unchanged from prior visit    ROS:  REVIEW OF SYSTEMS:  Constitution: Negative. Negative for chills, fever and night sweats.   HENT: Negative for congestion and headaches.    Eyes: Negative for blurred vision, left vision loss and right vision loss.   Cardiovascular: Negative for chest pain and syncope.   Respiratory: Negative for cough and shortness of breath.    Endocrine: Negative for polydipsia, polyphagia and polyuria.   Hematologic/Lymphatic: Negative for bleeding problem. Does not bruise/bleed easily.   Skin: Negative for dry skin, itching and rash.   Musculoskeletal: Negative for falls and muscle weakness.   Gastrointestinal: Negative for abdominal pain and bowel incontinence.   Allergic/Immunologic: Negative for hives and persistent infections.  Genitourinary: Negative for urinary retention/incontinence and nocturia.   Neurological: Negative for disturbances in coordination, no myelopathic symptoms such as handwriting changes or difficulty with buttons, coins, keys or small objects. No loss of balance and seizures.   Psychiatric/Behavioral: Negative for depression. The patient does not have insomnia.   Denies perineal paresthesias, bowel or bladder incontinence    EXAM:  Ht 5' 10" (1.778 m)  Wt 97.3 kg (214 lb 9.9 oz)  BMI 30.79 kg/m2    My physical " examination was notable for the following findings:     Normal station and gait, no difficulty with toe or heel walk.   Dorsal lumbar skin negative for rashes, lesions, hairy patches and surgical scars. There is minimal lumbar tenderness to palpation.  Lumbar range of motion is acceptable.  Global saggital and coronal spinal balance acceptable, not significant for scoliosis and kyphosis.  No pain with the range of motion of the bilateral hips. No trochanteric tenderness to palpation.  Stinchfield's positive.  Negative log roll.   Bilateral lower extremities warm and well perfused, dorsalis pedis pulses 2+ bilaterally.  Normal strength and tone in all major motor groups in the bilateral lower extremities. Normal sensation to light touch in the L2-S1 dermatomes bilaterally.  Deep tendon reflexes symmetric 2+ in the bilateral lower extremities.  Negative Babinski bilaterally. Straight leg raise negative bilaterally.      IMAGING:  No new imaging today.    Today I personally re- reviewed AP, Lat and Flex/Ex  upright L-spine that demonstrate mild to moderate disc space narrowing at L4/5 and L5/S1.      MRI lumbar spine demonstrates a broad based disc bulge with moderate spinal stenosis at L3/4, L4/5 and L5/S1.     MRI and xray of the right hip shows mild degenerative changes.       ASSESSMENT/PLAN:    Diagnoses and all orders for this visit:    Primary osteoarthritis of right hip  -     Procedure Order to [facility]; Future    Spinal stenosis, unspecified spinal region      The patient is having primarily right groin pain.  His back pain is significant improved since having the HAYLIE.  He would like to try an injection in the right hip.  Order placed today.  He will follow up with me after the injection to re-evaluate.    No Follow-up on file.

## 2017-04-19 ENCOUNTER — TELEPHONE (OUTPATIENT)
Dept: PAIN MEDICINE | Facility: CLINIC | Age: 67
End: 2017-04-19

## 2017-04-19 NOTE — TELEPHONE ENCOUNTER
Left voice message asking for a return call to schedule for Right Hip joint injection with Dr. James, referred by Dr. Lobo.

## 2017-04-28 ENCOUNTER — SURGERY (OUTPATIENT)
Age: 67
End: 2017-04-28

## 2017-04-28 ENCOUNTER — HOSPITAL ENCOUNTER (OUTPATIENT)
Facility: OTHER | Age: 67
Discharge: HOME | End: 2017-04-28
Attending: ANESTHESIOLOGY | Admitting: ANESTHESIOLOGY
Payer: MEDICARE

## 2017-04-28 VITALS
TEMPERATURE: 98 F | OXYGEN SATURATION: 95 % | SYSTOLIC BLOOD PRESSURE: 148 MMHG | WEIGHT: 215 LBS | DIASTOLIC BLOOD PRESSURE: 79 MMHG | HEIGHT: 70 IN | HEART RATE: 64 BPM | BODY MASS INDEX: 30.78 KG/M2 | RESPIRATION RATE: 18 BRPM

## 2017-04-28 DIAGNOSIS — M25.559 CHRONIC HIP PAIN, UNSPECIFIED LATERALITY: ICD-10-CM

## 2017-04-28 DIAGNOSIS — M16.9 OSTEOARTHRITIS OF HIP, UNSPECIFIED LATERALITY, UNSPECIFIED OSTEOARTHRITIS TYPE: Primary | ICD-10-CM

## 2017-04-28 DIAGNOSIS — G89.29 CHRONIC PAIN: ICD-10-CM

## 2017-04-28 DIAGNOSIS — G89.29 CHRONIC HIP PAIN, UNSPECIFIED LATERALITY: ICD-10-CM

## 2017-04-28 PROCEDURE — 20610 DRAIN/INJ JOINT/BURSA W/O US: CPT | Performed by: ANESTHESIOLOGY

## 2017-04-28 PROCEDURE — 77002 NEEDLE LOCALIZATION BY XRAY: CPT | Performed by: ANESTHESIOLOGY

## 2017-04-28 PROCEDURE — 25000003 PHARM REV CODE 250: Performed by: ANESTHESIOLOGY

## 2017-04-28 PROCEDURE — 20610 DRAIN/INJ JOINT/BURSA W/O US: CPT | Mod: RT,,, | Performed by: ANESTHESIOLOGY

## 2017-04-28 PROCEDURE — 63600175 PHARM REV CODE 636 W HCPCS: Performed by: ANESTHESIOLOGY

## 2017-04-28 PROCEDURE — 25500020 PHARM REV CODE 255: Performed by: ANESTHESIOLOGY

## 2017-04-28 PROCEDURE — 77002 NEEDLE LOCALIZATION BY XRAY: CPT | Mod: 26,59,RT, | Performed by: ANESTHESIOLOGY

## 2017-04-28 RX ORDER — LIDOCAINE HYDROCHLORIDE AND EPINEPHRINE 10; 10 MG/ML; UG/ML
INJECTION, SOLUTION INFILTRATION; PERINEURAL
Status: DISCONTINUED | OUTPATIENT
Start: 2017-04-28 | End: 2017-04-28 | Stop reason: HOSPADM

## 2017-04-28 RX ORDER — BUPIVACAINE HYDROCHLORIDE 2.5 MG/ML
INJECTION, SOLUTION EPIDURAL; INFILTRATION; INTRACAUDAL
Status: DISCONTINUED | OUTPATIENT
Start: 2017-04-28 | End: 2017-04-28 | Stop reason: HOSPADM

## 2017-04-28 RX ORDER — TRIAMCINOLONE ACETONIDE 40 MG/ML
INJECTION, SUSPENSION INTRA-ARTICULAR; INTRAMUSCULAR
Status: DISCONTINUED | OUTPATIENT
Start: 2017-04-28 | End: 2017-04-28 | Stop reason: HOSPADM

## 2017-04-28 RX ORDER — ALPRAZOLAM 0.5 MG/1
0.5 TABLET, ORALLY DISINTEGRATING ORAL
Status: COMPLETED | OUTPATIENT
Start: 2017-04-28 | End: 2017-04-28

## 2017-04-28 RX ADMIN — ALPRAZOLAM 0.5 MG: 0.5 TABLET, ORALLY DISINTEGRATING ORAL at 01:04

## 2017-04-28 RX ADMIN — LIDOCAINE HYDROCHLORIDE AND EPINEPHRINE 1 ML: 10; 10 INJECTION, SOLUTION INFILTRATION; PERINEURAL at 02:04

## 2017-04-28 RX ADMIN — TRIAMCINOLONE ACETONIDE 40 MG: 40 INJECTION, SUSPENSION INTRA-ARTICULAR; INTRAMUSCULAR at 02:04

## 2017-04-28 RX ADMIN — IOHEXOL 5 ML: 300 INJECTION, SOLUTION INTRAVENOUS at 02:04

## 2017-04-28 RX ADMIN — BUPIVACAINE HYDROCHLORIDE 10 ML: 2.5 INJECTION, SOLUTION EPIDURAL; INFILTRATION; INTRACAUDAL; PERINEURAL at 02:04

## 2017-04-28 NOTE — IP AVS SNAPSHOT
[facility] Location ([facility])  [address] [place], [place] [address]  Phone: [phone]           Patient Discharge Instructions   Our goal is to set you up for success. This packet includes information on your condition, medications, and your home care.  It will help you care for yourself to prevent having to return to the hospital.     Please ask your nurse if you have any questions.      There are many details to remember when preparing to leave the hospital. Here is what you will need to do:    1. Take your medicine. If you are prescribed medications, review your Medication List on the following pages. You may have new medications to  at the pharmacy and others that you'll need to stop taking. Review the instructions for how and when to take your medications. Talk with your doctor or nurses if you are unsure of what to do.     2. Go to your follow-up appointments. Specific follow-up information is listed in the following pages. Your may be contacted by a nurse or clinical provider about future appointments. Be sure we have all of the phone numbers to reach you. Please contact your provider's office if you are unable to make an appointment.     3. Watch for warning signs. Your doctor or nurse will give you detailed warning signs to watch for and when to call for assistance. These instructions may also include educational information about your condition. If you experience any of warning signs to your health, call your doctor.           [facility]  Unless otherwise directed by your provider, please   contact [facility], our nurse care line   that is available for 24/7 assistance.     [phone] (toll-free)     Registered nurses in the [facility]   provide: appointment scheduling, clinical advisement, health education, and other advisory services.                  ** Verify the list of medication(s) below is accurate and up to date. Carry this with you in case of  emergency. If your medications have changed, please notify your healthcare provider.             Medication List      ASK your doctor about these medications        Additional Info                      aspirin 81 MG Chew   Refills:  0   Dose:  81 mg    Instructions:  Take 81 mg by mouth once daily.     Begin Date    AM    Noon    PM    Bedtime       * atorvastatin 10 MG tablet   Commonly known as:  LIPITOR   Refills:  0   Dose:  10 mg    Instructions:  Take 10 mg by mouth once daily.     Begin Date    AM    Noon    PM    Bedtime       * atorvastatin 20 MG tablet   Commonly known as:  LIPITOR   Refills:  0   Dose:  20 mg    Instructions:  20 mg once daily.     Begin Date    AM    Noon    PM    Bedtime       azithromycin 250 MG tablet   Commonly known as:  Z-SHERRY   Refills:  0      Begin Date    AM    Noon    PM    Bedtime       CRESTOR 10 MG tablet   Refills:  3   Dose:  10 mg   Generic drug:  rosuvastatin    Instructions:  Take 10 mg by mouth once daily.     Begin Date    AM    Noon    PM    Bedtime       diazePAM 2 MG tablet   Commonly known as:  VALIUM   Refills:  0   Dose:  2 mg    Instructions:  Take 2 mg by mouth every 6 (six) hours as needed for Anxiety. Take 2 mg 1/2 hour before MRI  May repeat this dose twice      Disp. #3     Begin Date    AM    Noon    PM    Bedtime       levothyroxine 50 MCG tablet   Commonly known as:  SYNTHROID   Refills:  1   Dose:  25 mcg    Instructions:  Take 25 mcg by mouth once daily.     Begin Date    AM    Noon    PM    Bedtime       losartan 100 MG tablet   Commonly known as:  COZAAR   Refills:  6   Dose:  100 mg    Instructions:  Take 100 mg by mouth once daily.     Begin Date    AM    Noon    PM    Bedtime       meloxicam 15 MG tablet   Commonly known as:  MOBIC   Quantity:  90 tablet   Refills:  2   Comments:  **Patient requests 90 days supply**    Instructions:  TAKE 1 TABLET BY MOUTH ONCE DAILY     Begin Date    AM    Noon    PM    Bedtime       NEXIUM 40 MG capsule    Refills:  0   Generic drug:  esomeprazole      Begin Date    AM    Noon    PM    Bedtime       oxycodone-acetaminophen 5-325 mg per tablet   Commonly known as:  PERCOCET   Quantity:  20 tablet   Refills:  0   Dose:  1 tablet    Instructions:  Take 1 tablet by mouth every 4 (four) hours as needed for Pain.     Begin Date    AM    Noon    PM    Bedtime       pantoprazole 40 MG tablet   Commonly known as:  PROTONIX   Refills:  0   Dose:  40 mg    Instructions:  Take 40 mg by mouth once daily.     Begin Date    AM    Noon    PM    Bedtime       testosterone cypionate 200 mg/mL injection   Commonly known as:  DEPOTESTOTERONE CYPIONATE   Refills:  3      Begin Date    AM    Noon    PM    Bedtime       * Notice:  This list has 2 medication(s) that are the same as other medications prescribed for you. Read the directions carefully, and ask your doctor or other care provider to review them with you.               Please bring to all follow up appointments:    1. A copy of your discharge instructions.  2. All medicines you are currently taking in their original bottles.  3. Identification and insurance card.    Please arrive 15 minutes ahead of scheduled appointment time.    Please call 24 hours in advance if you must reschedule your appointment and/or time.            Discharge Instructions       Home Care Instructions Pain Management:    1. DIET:   You may resume your normal diet today.   2. BATHING:   You may shower with luke warm water. No soaking in tub.  3. DRESSING:   You may remove your bandage today.   4. ACTIVITY LEVEL:   You may resume your normal activities 24 hrs after your procedure.  5. MEDICATIONS:   You may resume your normal medications today.   6. SPECIAL INSTRUCTIONS:   No heat to the injection site for 24 hrs including, bath or shower, heating pad, moist heat, or hot tubs.    Use ice pack to injection site for any pain or discomfort.  Apply ice packs for 20 minute intervals as needed.   If you have  "received any sedatives by mouth today you may not drive for 12 hours.    If you have received any sedation through your IV, you may not drive for 24 hrs.     PLEASE CALL YOUR DOCTOR IF:  1. Redness or swelling around the injection site.  2. Fever of 101 degrees  3. Drainage (pus) from the injection site.  4. For any continuous bleeding (some dried blood over the incision is normal.)  5. For severe headache that is relieved when lying flat.    FOR EMERGENCIES:   If any unusual problems or difficulties occur during clinic hours, call [phone] or [phone].         Admission Information     Date & Time Provider Department CSN    4/28/2017  1:18 PM Ryan James MD [facility] [id number]      Care Providers     Provider Role Specialty Primary office phone    Ryan James MD Attending Provider Pain Medicine [phone]    Ryan James MD Surgeon  Pain Medicine [phone]      Your Vitals Were     BP Pulse Temp Resp Height Weight    171/87 68 97.5 °F (36.4 °C) (Oral) 17 5' 10" (1.778 m) 97.5 kg (215 lb)    SpO2 BMI             96% 30.85 kg/m2         Recent Lab Values     No lab values to display.      Allergies as of 4/28/2017     No Known Allergies      Advance Directives     An advance directive is a document which, in the event you are no longer able to make decisions for yourself, tells your healthcare team what kind of treatment you do or do not want to receive, or who you would like to make those decisions for you.  If you do not currently have an advance directive, [facility] encourages you to create one.  For more information call:  [phone]), [phone] ([phone]),  or log on to [email or web address].        Language Assistance Services     ATTENTION: Language assistance services are available, free of charge. Please call [phone].      ATENCIÓN: Si habla español, tiene a rivera disposición servicios gratuitos de asistencia lingüística. Llame al " [phone].     [facility] Ý: N?u b?n nói Ti?ng Vi?t, có các d?ch v? h? tr? ngôn ng? mi?n phí dành cho b?n. G?i s? [phone].        [facility] Sign-Up     Activating your [facility] account is as easy as 1-2-3!     1) Visit [email or web address], select Sign Up Now, enter this activation code and your date of birth, then select Next.  [email or web address]  Expires: 6/12/2017  2:39 PM      2) Create a username and password to use when you visit [facility] in the future and select a security question in case you lose your password and select Next.    3) Enter your e-mail address and click Sign Up!    Additional Information  If you have questions, please e-mail ALKILU [email or web address] or call [phone] to talk to our [facility] staff. Remember, [facility] is NOT to be used for urgent needs. For medical emergencies, dial 911.          [facility] complies with applicable Federal civil rights laws and does not discriminate on the basis of race, color, national origin, age, disability, or sex.

## 2017-04-28 NOTE — DISCHARGE INSTRUCTIONS

## 2017-04-28 NOTE — OP NOTE
Patient Name: Moshe Murphy  MRN: [id number]    INFORMED CONSENT: The procedure, risks, benefits and options were discussed with patient. There are no contraindications to the procedure. The patient expressed understanding and agreed to proceed. The personnel performing the procedure was discussed. I verify that I personally obtained Moshe's consent prior to the start of the procedure and the signed consent can be found on the patient's chart.    Procedure Date: 04/28/2017    Anesthesia: Topical    Pre Procedure diagnosis:   1. Osteoarthritis of hip, unspecified laterality, unspecified osteoarthritis type    2. Chronic hip pain, unspecified laterality    3. Chronic pain      Post-Procedure diagnosis: same      Sedation: None    PROCEDURE: right  ACETABULOFEMORAL JOINT INJECTION (HIP)      DESCRIPTION OF PROCEDURE: The patient was brought to the procedure room.  After performing time out. IV access was obtained prior to the procedure.  The patient was positioned supine on the fluoroscopy table.  Continuous hemodynamic monitoring was initiated including blood pressure, EKG, and pulse oximetry. .  The skin overlying the hip was prepped with chlorhexidine three times and draped in a sterile fashion.  The skin and subcutaneous tissue was anesthetized using 3 cc of lidocaine 1%.  A 22 gauge, 3 1/2 spinal needle was slowly advanced through under fluoroscopic guidance into the acetabulofemoral joint. The needle position was confirmed using oblique, AP and lateral fluoroscopic imaging.  Negative aspiration was confirmed. 2 cc of Omnipaque 300 was injected confirming intra-articular contrast spread. A combination of 5 cc of bupivacaine 0.25% and 40 mg kenalog was easily injected. The needle was removed and bleeding was nill.  A sterile dressing was applied. Moshe was taken back to the recovery room for further observation.     Blood Loss: Nill  Specimen: None    Ryan James MD

## 2017-04-28 NOTE — DISCHARGE SUMMARY
Discharge Note  Short Stay      SUMMARY     Admit Date: 4/28/2017    Attending Physician: Ryan James      Discharge Physician: Ryan James      Discharge Date: 4/28/2017 2:37 PM    Final Diagnosis:   1. Osteoarthritis of hip, unspecified laterality, unspecified osteoarthritis type    2. Chronic hip pain, unspecified laterality    3. Chronic pain        Disposition: Home or self care    Patient Instructions:   Current Discharge Medication List      CONTINUE these medications which have NOT CHANGED    Details   aspirin 81 MG Chew Take 81 mg by mouth once daily.      levothyroxine (SYNTHROID) 50 MCG tablet Take 25 mcg by mouth once daily.  Refills: 1      losartan (COZAAR) 100 MG tablet Take 100 mg by mouth once daily.  Refills: 6      meloxicam (MOBIC) 15 MG tablet TAKE 1 TABLET BY MOUTH ONCE DAILY  Qty: 90 tablet, Refills: 2    Comments: **Patient requests 90 days supply**      NEXIUM 40 mg capsule       oxycodone-acetaminophen (PERCOCET) 5-325 mg per tablet Take 1 tablet by mouth every 4 (four) hours as needed for Pain.  Qty: 20 tablet, Refills: 0      pantoprazole (PROTONIX) 40 MG tablet Take 40 mg by mouth once daily.      !! atorvastatin (LIPITOR) 10 MG tablet Take 10 mg by mouth once daily.      !! atorvastatin (LIPITOR) 20 MG tablet 20 mg once daily.       azithromycin (Z-SHERRY) 250 MG tablet Refills: 0      CRESTOR 10 mg tablet Take 10 mg by mouth once daily.  Refills: 3      diazepam (VALIUM) 2 MG tablet Take 2 mg by mouth every 6 (six) hours as needed for Anxiety. Take 2 mg 1/2 hour before MRI  May repeat this dose twice      Disp. #3      testosterone cypionate (DEPOTESTOTERONE CYPIONATE) 200 mg/mL injection Refills: 3       !! - Potential duplicate medications found. Please discuss with provider.              Discharge Diagnosis: Same as Pre and Post Procedure  Condition on Discharge: Stable.  Diet on Discharge: Same as before.  Activity: as per instruction sheet.  Discharge to: Home with a  responsible adult.  Follow up: as per Discharge instructions.

## 2017-05-17 ENCOUNTER — TELEPHONE (OUTPATIENT)
Dept: DERMATOLOGY | Facility: CLINIC | Age: 67
End: 2017-05-17

## 2017-05-17 NOTE — TELEPHONE ENCOUNTER
----- Message from Mia Rawls sent at 5/17/2017  1:54 PM CDT -----  Contact: PT  PT has red spots on his leg for over a month. PT said it's getting redder than it was before.      PT callback: [phone]

## 2017-06-09 ENCOUNTER — OFFICE VISIT (OUTPATIENT)
Dept: DERMATOLOGY | Facility: CLINIC | Age: 67
End: 2017-06-09
Payer: MEDICARE

## 2017-06-09 VITALS — WEIGHT: 215 LBS | BODY MASS INDEX: 30.85 KG/M2

## 2017-06-09 DIAGNOSIS — B08.1 BATEMAN'S DISEASE: ICD-10-CM

## 2017-06-09 DIAGNOSIS — I87.2 STASIS DERMATITIS OF BOTH LEGS: Primary | ICD-10-CM

## 2017-06-09 PROCEDURE — 99202 OFFICE O/P NEW SF 15 MIN: CPT | Mod: S$GLB,,, | Performed by: DERMATOLOGY

## 2017-06-09 PROCEDURE — 99999 PR PBB SHADOW E&M-EST. PATIENT-LVL II: CPT | Mod: PBBFAC,,, | Performed by: DERMATOLOGY

## 2017-06-09 PROCEDURE — 1159F MED LIST DOCD IN RCRD: CPT | Mod: S$GLB,,, | Performed by: DERMATOLOGY

## 2017-06-09 RX ORDER — BETAMETHASONE DIPROPIONATE 0.5 MG/G
CREAM TOPICAL 2 TIMES DAILY
Qty: 45 G | Refills: 3 | Status: SHIPPED | OUTPATIENT
Start: 2017-06-09 | End: 2019-06-11

## 2017-06-09 NOTE — PROGRESS NOTES
Subjective:       Patient ID:  Moshe Murphy is a 66 y.o. male who presents for   Chief Complaint   Patient presents with    Lesion    Skin Check     History of Present Illness: The patient presents with chief complaint of rash.  Location: lower legs  Duration: months  Signs/Symptoms: none    Prior treatments: none    Also bruising on arms for years no tx not painful         Lesion         Review of Systems   Constitutional: Negative for fever.   Skin: Negative for itching and rash.   Hematologic/Lymphatic: Bruises/bleeds easily.        Objective:    Physical Exam   Constitutional: He appears well-developed and well-nourished. No distress.   Neurological: He is alert and oriented to person, place, and time. He is not disoriented.   Psychiatric: He has a normal mood and affect.   Skin:   Areas Examined (abnormalities noted in diagram):   Head / Face Inspection Performed  Neck Inspection Performed  Chest / Axilla Inspection Performed  RUE Inspected  LUE Inspection Performed  RLE Inspected  LLE Inspection Performed              Diagram Legend      Erythematous eczematous patches and plaques       Assessment / Plan:        Stasis dermatitis of both legs  -     betamethasone dipropionate (DIPROLENE) 0.05 % cream; Apply topically 2 (two) times daily.  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

## 2018-02-23 DIAGNOSIS — M25.551 RIGHT HIP PAIN: Primary | ICD-10-CM

## 2018-03-07 ENCOUNTER — CLINICAL SUPPORT (OUTPATIENT)
Dept: REHABILITATION | Facility: HOSPITAL | Age: 68
End: 2018-03-07
Payer: MEDICARE

## 2018-03-07 DIAGNOSIS — M25.551 RIGHT HIP PAIN: ICD-10-CM

## 2018-03-07 PROCEDURE — 97110 THERAPEUTIC EXERCISES: CPT

## 2018-03-07 PROCEDURE — 97161 PT EVAL LOW COMPLEX 20 MIN: CPT

## 2018-03-07 PROCEDURE — G8978 MOBILITY CURRENT STATUS: HCPCS | Mod: CK

## 2018-03-07 PROCEDURE — G8979 MOBILITY GOAL STATUS: HCPCS | Mod: CJ

## 2018-03-07 NOTE — PLAN OF CARE
Physical Therapy Initial Evaluation     Name: Moshe Vallejo [facility] Number: [id number]    Diagnosis: No diagnosis found.  Physician: Gael Lyn Jr., *  Treatment Orders: PT Eval and Treat  Past Medical History:   Diagnosis Date    Acid reflux     Hyperlipidemia     Hypertension      Current Outpatient Prescriptions   Medication Sig    aspirin 81 MG Chew Take 81 mg by mouth once daily.    atorvastatin (LIPITOR) 10 MG tablet Take 10 mg by mouth once daily.    atorvastatin (LIPITOR) 20 MG tablet 20 mg once daily.     azithromycin (Z-SHERRY) 250 MG tablet     betamethasone dipropionate (DIPROLENE) 0.05 % cream Apply topically 2 (two) times daily.    CRESTOR 10 mg tablet Take 10 mg by mouth once daily.    diazepam (VALIUM) 2 MG tablet Take 2 mg by mouth every 6 (six) hours as needed for Anxiety. Take 2 mg 1/2 hour before MRI  May repeat this dose twice      Disp. #3    levothyroxine (SYNTHROID) 50 MCG tablet Take 25 mcg by mouth once daily.    losartan (COZAAR) 100 MG tablet Take 100 mg by mouth once daily.    meloxicam (MOBIC) 15 MG tablet TAKE 1 TABLET BY MOUTH ONCE DAILY    NEXIUM 40 mg capsule     oxycodone-acetaminophen (PERCOCET) 5-325 mg per tablet Take 1 tablet by mouth every 4 (four) hours as needed for Pain.    pantoprazole (PROTONIX) 40 MG tablet Take 40 mg by mouth once daily.    testosterone cypionate (DEPOTESTOTERONE CYPIONATE) 200 mg/mL injection      No current facility-administered medications for this visit.      Review of patient's allergies indicates:  No Known Allergies    Time In: 1:00 pm   Time Out: 2:00 pm     Subjective     Patient reports he had a R ROSIBEL (anterior approach) on 2/6/18 secondary to OA. Pt did not have home health and came straight to outpatient PT. Pt has been doing exercises that MD gave him. 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 Pt feels  like he is pushing himself; pt is only doing upper body in the gym. Pt reports he has mild pain by the incision. Pt no longer has low back pain since surgery. Pt reports his R knee pain has worsened since knee surgery. Other co morbidities include high HTN, high cholesterol.   Pain Scale: Moshe rates pain on a scale of 0-10 to be 2 at worst; 0 currently; 0 at best .  Onset: gradual  Aggravating factors: bending over   Easing factors:  Rest   Prior Therapy: none  Functional Deficits Leading to Referral: pain and limitations with functional mobility  Prior functional status: active, exercises 6x/week   Occupation:  Retired                        Pts goals:  Return to PLOF with active lifestyle and exercising 6x/week.     Objective   Incision appear to be healing well with no sign of infection.    Posture Alignment: slouched posture, forward flexed posture     Palpation: TTP incision anterior R hip, scar tissue noted at incision site    Hip AROM:   FLEX L 112 deg, R 100 deg  ABD L 30 deg, R 18 deg    LOWER EXTREMITY STRENGTH:   Left Right   Quadriceps 5/5 4+/5   Hamstrings 5/5 4/5     Iliopsoas 5/5 4+/5   PGM 4+/5 4/5   Hip IR 5/5 NT   Hip ER 5/5 NT   Hip Ext 4+/5 NT     FLEXIBILITY: tight hamstrings, hip flexors    GAIT: Moshe displays antalgic limp on R LE    Pt/family was provided educational information, including: role of PT, goals for PT, scheduling - pt verbalized understanding. Discussed insurance limitations with pt.     TREATMENT     PT Evaluation Completed? Yes  Discussed Plan of Care with patient: Yes    Moshe received 15 minutes of therapeutic exercise including:  Recumbent bike 5 min   PPT 3x10  Glut sets 3x10  SLR 2x10  Supine hip ABD 2x10    Moshe received 3 minutes of manual therapy including:  Scar massage     Written Home Exercises Provided: exercises listed above (see handout)   Moshe demo good understanding of the education provided. Patient demo good return demo of skill of  exercises.    Assessment     History  Co-morbidities and personal factors that may impact the plan of care Examination  Body Structures and Functions, activity limitations and participation restrictions that may impact the plan of care    Clinical Presentation   Co-morbidities:   HTN and high cholesterol         Personal Factors:   lifestyle Body Regions:   lower extremities    Body Systems:    gross symmetry  ROM  strength  gross coordinated movement  balance  gait  transfers  transitions            Participation Restrictions:   Pt wants to return to working out 6x/week     Activity limitations:   Learning and applying knowledge  no deficits    General Tasks and Commands  no deficits    Communication  no deficits    Mobility  walking    Self care  no deficits    Domestic Life  no deficits    Interactions/Relationships  no deficits    Life Areas  no deficits    Community and Social Life  recreation and leisure         stable and uncomplicated                      low   low  low Decision Making/ Complexity Score:  low     Patient s/p R ROSIBEL (2/6/18) presents with decreased ROM, decreased strength, gait deviations, pain and tenderness, poor postural awareness, and subsequent functional limitations. Pt will benefit from PT to address these deficits. Good tolerance to exercises today with no adverse reaction.   Pt prognosis is Excellent.  Pt will benefit from skilled outpatient physical therapy to address the above stated deficits, provide pt/family education and to maximize pt's level of independence.     Medical necessity is demonstrated by the following IMPAIRMENTS/PROBLEMS:  1. Increased Pain  2. Decreased Segmental Mobility & Decreased ROM  3. Decreased Core & BLE strength  4. Decreased Flexibility BLE  5. Decreased Tolerance to Functional Activities    Pt's spiritual, cultural and educational needs considered and pt agreeable to plan of care and goals as stated below:     Anticipated Barriers for physical therapy:  none    Short Term GOALS: 3 weeks. Pt agrees with goals set.  1. Patient demonstrates independence with HEP.   2. Patient demonstrates independence with Postural Awareness.   3. Patient demonstrates independence with body mechanics.     Long Term GOALS: 6 weeks. Pt agrees with goals set.  1. Patient demonstrates increased R hip AROM to 10 degrees to improve tolerance to functional activities.   2. Patient demonstrates increased strength BLE's to 5/5 or greater to improve tolerance to functional activities.   3. Patient demonstrates improved overall function per FOTO Lumbar Survey to 33% or less.     Functional Limitations Reports - G Codes  Category: Mobility  Tool: FOTO Lumbar Survey  Score: 43% Limitation   TEST SCORE  Modifier  Impairment Limitation Restriction   0  CH  0 % impaired, limited or restricted   1-9  CI  @ least 1% but less than 20% impaired, limited or restricted   10- 19  CJ  @ least 20%<40% impaired, limited or restricted   20- 29  CK  @ least 40%<60% impaired, limited or restricted   30- 39  CL  @ least 60% <80% impaired, limited or restricted   40- 49  CM  @ least 80%<100% impaired limited or restricted   50/50  CN  100% impaired, limited or restricted     Current/: CK = 43% Limitation  Goal/ : CJ = 33% Limitation     PLAN     Outpatient physical therapy 1-2 times weekly to include: pt ed, hep, therapeutic exercises, neuromuscular re-education/ balance exercises, joint mobilizations, aquatic therapy and modalities prn. Cont PT for  6 weeks. Pt may be seen by PTA as part of the rehabilitation team.     Therapist: Pantera Gutierrez, PT  3/7/2018

## 2018-03-09 ENCOUNTER — CLINICAL SUPPORT (OUTPATIENT)
Dept: REHABILITATION | Facility: HOSPITAL | Age: 68
End: 2018-03-09
Payer: MEDICARE

## 2018-03-09 DIAGNOSIS — R29.898 WEAKNESS OF RIGHT HIP: ICD-10-CM

## 2018-03-09 DIAGNOSIS — M25.551 PAIN OF RIGHT HIP JOINT: ICD-10-CM

## 2018-03-09 PROCEDURE — 97110 THERAPEUTIC EXERCISES: CPT

## 2018-03-09 NOTE — PROGRESS NOTES
Physical Therapy Daily Note     Name: Moshe Vallejo [facility] Number: [id number]  Diagnosis:   Encounter Diagnoses   Name Primary?    Pain of right hip joint     Weakness of right hip      Physician: Gael Lyn Jr., *  Precautions: HTN  Visit #: 2 of 12  PTA Visit #: --  Time In: 8:00  Time Out: 9:00  Total Treatment Time 1:1: 30    Evaluation Date: 3/7/2018  Visit # authorized: 20  Authorization period: 12/31/2018  Plan of care Expiration: 4/18/2018  MD referral: Dr. Lyn    Subjective     Pt reports: He feels good today. He has been doing some of the exercises that dee has given him as well as some of his own.  He states that the pain his not an issue.   Pain Scale: Moshe rates pain on a scale of 0-10 to be 3 currently.    Objective     Moshe received individual therapeutic exercises to develop strength, endurance, ROM and core stabilization for 45 minutes including:  Bike 5 minutes  Supine abd and add 2x10 each  Supine HSS 5x20s  PPT 3x10  Clams 3x10  Ball squeezes 3x10  Step ups 2x10 L4 3x10  Standing straight leg hip flex and abd 2x10    The patient received the following supervised modalities after being cleared for contradictions: ice to R hip    Written Home Exercises Provided: at eval  Pt demo good understanding of the education provided. Moshe demonstrated good return demonstration of activities.     Education provided re: to keep up with HEP and not to do too much  Moshe verbalized good understanding of education provided.   No spiritual or educational barriers to learning provided    Assessment     Patient tolerated tx well w/o adverse reaction. Patient reported feeling better following manual therapy.  Patient tolerated new exercises well with good response. Patient was instructed that he can do the mini squats that he is doing at home. Progressing well towards goals but will need continued therapy to meet goals.  Patient remains a good  candidate for skilled therapy.  Patient reported no increased symptoms following session.     This is a 67 y.o. male referred to outpatient physical therapy and presents with a medical diagnosis of s/p R ROSIBEL and demonstrates limitations as described in the problem list. Pt prognosis is Good. Pt will continue to benefit from skilled outpatient physical therapy to address the deficits listed in the problem list, provide pt/family education and to maximize pt's level of independence in the home and community environment.     Goals as follows:  Short Term GOALS: 3 weeks. Pt agrees with goals set.  1. Patient demonstrates independence with HEP. (progressing, not met)  2. Patient demonstrates independence with Postural Awareness. (progressing, not met)  3. Patient demonstrates independence with body mechanics. (progressing, not met)     Long Term GOALS: 6 weeks. Pt agrees with goals set.  1. Patient demonstrates increased R hip AROM to 10 degrees to improve tolerance to functional activities. (progressing, not met)  2. Patient demonstrates increased strength BLE's to 5/5 or greater to improve tolerance to functional activities. (progressing, not met)  3. Patient demonstrates improved overall function per FOTO Lumbar Survey to 33% or less. (progressing, not met)     Plan     Continue with established Plan of Care towards PT goals.    Therapist: Jesus Jaramillo, PT  3/9/2018

## 2018-03-13 ENCOUNTER — CLINICAL SUPPORT (OUTPATIENT)
Dept: REHABILITATION | Facility: HOSPITAL | Age: 68
End: 2018-03-13
Payer: MEDICARE

## 2018-03-13 ENCOUNTER — TELEPHONE (OUTPATIENT)
Dept: NEUROSURGERY | Facility: CLINIC | Age: 68
End: 2018-03-13

## 2018-03-13 DIAGNOSIS — M25.551 PAIN OF RIGHT HIP JOINT: ICD-10-CM

## 2018-03-13 DIAGNOSIS — R29.898 WEAKNESS OF RIGHT HIP: ICD-10-CM

## 2018-03-13 PROCEDURE — 97110 THERAPEUTIC EXERCISES: CPT

## 2018-03-13 NOTE — PROGRESS NOTES
Physical Therapy Daily Note     Name: Moshe Vallejo [facility] Number: [id number]  Diagnosis:   Encounter Diagnoses   Name Primary?    Pain of right hip joint     Weakness of right hip      Physician: Gael Lyn Jr., *  Precautions: HTN  Visit #: 3 of 12  PTA Visit #: --  Time In: 9:30  Time Out: 10:30  Total Treatment Time 1:1: 45    Evaluation Date: 3/7/2018  Visit # authorized: 20  Authorization period: 12/31/2018  Plan of care Expiration: 4/18/2018  MD referral: Dr. Lyn    Subjective     Pt reports: he is doing well.  He states that he has been doing his exercises at home and feels comfortable with them.   Pain Scale: Moshe rates pain on a scale of 0-10 to be 0 currently.    Objective     Moshe received individual therapeutic exercises to develop strength, endurance, ROM and core stabilization for 45 minutes including:  Bike 5 minutes  Supine abd and add 2x10 each  Supine HSS 5x20s  PPT 3x10  Clams 3x10  Ball squeezes 3x10 - NT  Step ups 2x10 L4 3x10  Steam boats 2x10  Anti-rotation walk outs Gcord 3x10 B  Crab walks no resistance 3x10 B    The patient received the following supervised modalities after being cleared for contradictions: refused ice    Written Home Exercises Provided: at eval  Pt demo good understanding of the education provided. Moshe demonstrated good return demonstration of activities.     Education provided re: to keep up with HEP and not to do too much  Moshe verbalized good understanding of education provided.   No spiritual or educational barriers to learning provided    Assessment     Patient tolerated tx well w/o adverse reaction. Patient tolerated new exercises well with good response. Patient was instructed that it was okay to do exercises in the pool such as walk outs and steam boats.  Progressing well towards goals but will need continued therapy to meet goals.  Patient remains a good candidate for skilled therapy.  Patient  reported no increased symptoms following session.     This is a 67 y.o. male referred to outpatient physical therapy and presents with a medical diagnosis of s/p R ROSIBEL and demonstrates limitations as described in the problem list. Pt prognosis is Good. Pt will continue to benefit from skilled outpatient physical therapy to address the deficits listed in the problem list, provide pt/family education and to maximize pt's level of independence in the home and community environment.     Goals as follows:  Short Term GOALS: 3 weeks. Pt agrees with goals set.  1. Patient demonstrates independence with HEP. (progressing, not met)  2. Patient demonstrates independence with Postural Awareness. (progressing, not met)  3. Patient demonstrates independence with body mechanics. (progressing, not met)     Long Term GOALS: 6 weeks. Pt agrees with goals set.  1. Patient demonstrates increased R hip AROM to 10 degrees to improve tolerance to functional activities. (progressing, not met)  2. Patient demonstrates increased strength BLE's to 5/5 or greater to improve tolerance to functional activities. (progressing, not met)  3. Patient demonstrates improved overall function per FOTO Lumbar Survey to 33% or less. (progressing, not met)     Plan     Continue with established Plan of Care towards PT goals.    Therapist: Jesus Jaramillo, PT  3/13/2018

## 2018-03-13 NOTE — TELEPHONE ENCOUNTER
----- Message from Bryant Guaman MD sent at 3/12/2018  2:04 PM CDT -----  Patient discussed with Dr. Sky. He will need to be seen for headaches and dizziness following ROSIBEL with spinal. Jose Daniel is aware, please contact to schedule appointment as soon as possible.    Thank you.    Juanjo Guaman

## 2018-03-15 ENCOUNTER — OFFICE VISIT (OUTPATIENT)
Dept: NEUROSURGERY | Facility: CLINIC | Age: 68
End: 2018-03-15
Payer: MEDICARE

## 2018-03-15 VITALS
SYSTOLIC BLOOD PRESSURE: 147 MMHG | DIASTOLIC BLOOD PRESSURE: 85 MMHG | BODY MASS INDEX: 30.78 KG/M2 | HEART RATE: 73 BPM | WEIGHT: 215 LBS | HEIGHT: 70 IN

## 2018-03-15 DIAGNOSIS — G96.810 INTRACRANIAL HYPOTENSION: Primary | ICD-10-CM

## 2018-03-15 PROCEDURE — 3079F DIAST BP 80-89 MM HG: CPT | Mod: CPTII,S$GLB,, | Performed by: NEUROLOGICAL SURGERY

## 2018-03-15 PROCEDURE — 3077F SYST BP >= 140 MM HG: CPT | Mod: CPTII,S$GLB,, | Performed by: NEUROLOGICAL SURGERY

## 2018-03-15 PROCEDURE — 99999 PR PBB SHADOW E&M-EST. PATIENT-LVL III: CPT | Mod: PBBFAC,,, | Performed by: NEUROLOGICAL SURGERY

## 2018-03-15 PROCEDURE — 99203 OFFICE O/P NEW LOW 30 MIN: CPT | Mod: S$GLB,,, | Performed by: NEUROLOGICAL SURGERY

## 2018-03-15 RX ORDER — METHYLPREDNISOLONE 4 MG/1
TABLET ORAL
Qty: 1 TABLET | Refills: 0 | Status: SHIPPED | OUTPATIENT
Start: 2018-03-15 | End: 2019-06-11

## 2018-03-15 NOTE — LETTER
March 15, 2018      Chano Rogers MD  [address]  [address]  [place] [address]           [facility] - Neurosurgery 7th Fl  [address]  [facility] [address]  Phone: [phone]          Patient: Moshe Murphy   MR Number: [id number]   YOB: 1950   Date of Visit: 3/15/2018       Dear Dr. Chano Rogers:    Thank you for referring Moshe Murphy to me for evaluation. Attached you will find relevant portions of my assessment and plan of care.    If you have questions, please do not hesitate to call me. I look forward to following Moshe Murphy along with you.    Sincerely,    Jasvir Sky MD    Enclosure  CC:  No Recipients    If you would like to receive this communication electronically, please contact [email or web address] or [phone] to request more information on [employer] access.    For providers and/or their staff who would like to refer a patient to [facility], please contact us through our one-stop-shop provider referral line, [facility], at [phone].    If you feel you have received this communication in error or would no longer like to receive these types of communications, please e-mail [email or web address]

## 2018-03-15 NOTE — PROGRESS NOTES
Dear Chano Santos MD,    Thank you for referring this patient to my clinic. The full details of my evaluation will also be forthcoming to you by letter.    CHIEF COMPLAINT:  CSF leak    HPI:  Moshe Murphy is a 67 y.o.  male with a recent epidural anesthetic administered at an OSH during total hip replacement surgery in late 2/18, which was complicated by a CSF leak. He has had positional headaches, dizziness and some blurry vision since that time. His headache is bitemporal and worse with standing and better with lying flat. It has been getting better over the last few days but the dizziness remains. He has had two blood patches in the lumbar spine which have not helped. He has had no fever, chills or night sweats.    Review of patient's allergies indicates:  No Known Allergies    Past Medical History:   Diagnosis Date    Acid reflux     Hyperlipidemia     Hypertension      Past Surgical History:   Procedure Laterality Date    ELBOW SURGERY      KNEE ARTHROSCOPY      SHOULDER SURGERY      stents       Family History   Problem Relation Age of Onset    Heart disease Mother     Stroke Mother      Social History   Substance Use Topics    Smoking status: Never Smoker    Smokeless tobacco: Never Used    Alcohol use Yes      Comment: socially        Review of Systems    OBJECTIVE:       Vital Signs:  Pulse: 73 (03/15/18 1135)  BP: (!) 147/85 (03/15/18 1135)    Physical Exam:    Vital signs: All nursing notes and vital signs reviewed -- afebrile, vital signs stable.  Constitutional: Patient sitting comfortably in chair. Appears well developed and well nourished.  Skin: Exposed areas are intact without abnormal markings, rashes or other lesions.  HEENT: Normocephalic. Normal conjunctivae.  Cardiovascular: Normal rate and regular rhythm.  Respiratory: Chest wall rises and falls symmetrically, without signs of respiratory distress.  Abdomen: Soft and non-tender.  Extremities: Warm and without  edema. Calves supple, non-tender.  Psych/Behavior: Normal affect.    Neurological:    Mental status: Alert and oriented. Conversational and appropriate.       Cranial Nerves: VFF to confrontation. PERRL. EOMI without nystagmus. Facial STLT normal and symmetric. Strong, symmetric muscles of mastication. Facial strength full and symmetric. Hearing equal bilaterally to finger rub. Palate and uvula rise and fall normally in midline. Shoulder shrug 5/5 strength. Tongue midline.     Motor:    Upper:  Deltoids Triceps Biceps WE WF     R 5/5 5/5 5/5 5/5 5/5 5/5    L 5/5 5/5 5/5 5/5 5/5 5/5      Lower:  HF KE KF DF PF EHL    R 5/5 5/5 5/5 5/5 5/5 5/5    L 5/5 5/5 5/5 5/5 5/5 5/5     Sensory: Intact sensation to light touch in all extremities. Romberg negative.    Reflexes:          DTR: 2+ symmetrically throughout.     Abdominal reflexes: Normal, symmetric.     Morfin's: Negative.     Babinski's: Negative.     Clonus: Negative.    Cerebellar: Finger-to-nose and rapid alternating movements normal. Gait stable, fluid.    Spine:    Neck is supple but there is some mild TTP. He also has pain limited ROM in all directions. Negative Kernig Brudzinski signs.    I believe I can identify the injection site in the region of L4-5. There is no palpable fluid collection underneath.    Diagnostic Results:  No recent imaging available      ASSESSMENT/PLAN:     Moshe Murphy has a suspected spinal fluid leak after lumbar epidural anesthesia a few weeks ago, which didn't resolve with multiple lumbar blood patches. His headaches have improved over the last few days, but his dizziness remains. Since he is improving, I will not pursue a lumbar myelogram at this point, but I would like to assess an MRI brain for evidence of intracranial hypotension. In the meantime I will prescribe a medrol dosepack for meningeal irritation.    The patient understands and agrees with the plan of care. All questions were answered.     1. MRI brain +/-  gloria  2. Medrol Dosepack  3. RTC pending #1-2          Jasvir Sky M.D.  Department of Neurosurgery  [facility]      .

## 2018-03-20 ENCOUNTER — CLINICAL SUPPORT (OUTPATIENT)
Dept: REHABILITATION | Facility: HOSPITAL | Age: 68
End: 2018-03-20
Payer: MEDICARE

## 2018-03-20 DIAGNOSIS — R29.898 WEAKNESS OF RIGHT HIP: ICD-10-CM

## 2018-03-20 DIAGNOSIS — M25.551 PAIN OF RIGHT HIP JOINT: ICD-10-CM

## 2018-03-20 PROCEDURE — 97110 THERAPEUTIC EXERCISES: CPT

## 2018-03-20 NOTE — PROGRESS NOTES
"                                                    Physical Therapy Daily Note     Name: Moshe Vallejo [facility] Number: [id number]  Diagnosis:   Encounter Diagnoses   Name Primary?    Pain of right hip joint     Weakness of right hip      Physician: Gael Lyn Jr., *  Precautions: HTN, he had a R ROSIBEL (anterior approach) on 2/6/18   Visit #: 4 of 12  PTA Visit #: 1  Time In: 10:00 a  Time Out: 10:42  Total Treatment Time 1:1: 30    Evaluation Date: 3/7/2018  Visit # authorized: 20  Authorization period: 12/31/2018  Plan of care Expiration: 4/18/2018  MD referral: Dr. Lyn    Subjective     Pt reports: hip doing food   Pain Scale: Moshe rates pain on a scale of 0-10 to be 0 currently.    Objective     Moshe received individual therapeutic exercises to develop strength, endurance, ROM and core stabilization for 42 minutes including:  Bike 5 minutes  SL hip abd 2x10  Supine HSS 3x30"  PPT 3x10  Clams 3x10  Bridge w/ abd RTB 2x10  Ball squeezes 3x10 - NT  Step ups 2x10 L4 3x10  Lateral step ups L4 2x10  Steam boats 2x10  RTB  Anti-rotation walk outs Gcord 3x10 B  Crab walks RTB  2 laps  Shuttle 3 C 2x10  Forward/lat walks w/ MSC 20x  SLS on airex 2x 30"    The patient received the following supervised modalities after being cleared for contradictions: refused ice    Written Home Exercises Provided: at eval  Pt demo good understanding of the education provided. Moshe demonstrated good return demonstration of activities.     Education provided re: to keep up with HEP and not to do too much  Moshe verbalized good understanding of education provided.   No spiritual or educational barriers to learning provided    Assessment     Patient tolerated tx well w/o adverse reaction. Added izzy to challenge pt's hip strength and stability. No pain throughout session. Poor stability w/ SLS, finger holds needed EOT. Tactile cures needed for Sl hip abd, pt compensated w/ hip flexion. Pt cont to benefit from skilled PT  This is " a 67 y.o. male referred to outpatient physical therapy and presents with a medical diagnosis of s/p R ROSIBEL and demonstrates limitations as described in the problem list. Pt prognosis is Good. Pt will continue to benefit from skilled outpatient physical therapy to address the deficits listed in the problem list, provide pt/family education and to maximize pt's level of independence in the home and community environment.     Goals as follows:  Short Term GOALS: 3 weeks. Pt agrees with goals set.  1. Patient demonstrates independence with HEP. (progressing, not met)  2. Patient demonstrates independence with Postural Awareness. (progressing, not met)  3. Patient demonstrates independence with body mechanics. (progressing, not met)     Long Term GOALS: 6 weeks. Pt agrees with goals set.  1. Patient demonstrates increased R hip AROM to 10 degrees to improve tolerance to functional activities. (progressing, not met)  2. Patient demonstrates increased strength BLE's to 5/5 or greater to improve tolerance to functional activities. (progressing, not met)  3. Patient demonstrates improved overall function per FOTO Lumbar Survey to 33% or less. (progressing, not met)     Plan     Continue with established Plan of Care towards PT goals.    Therapist: Brandee Laws, PTA  3/20/2018

## 2018-03-21 ENCOUNTER — TELEPHONE (OUTPATIENT)
Dept: SPINE | Facility: CLINIC | Age: 68
End: 2018-03-21

## 2018-03-21 DIAGNOSIS — G96.810 INTRACRANIAL HYPOTENSION: Primary | ICD-10-CM

## 2018-03-23 ENCOUNTER — CLINICAL SUPPORT (OUTPATIENT)
Dept: REHABILITATION | Facility: HOSPITAL | Age: 68
End: 2018-03-23
Payer: MEDICARE

## 2018-03-23 DIAGNOSIS — M25.551 PAIN OF RIGHT HIP JOINT: ICD-10-CM

## 2018-03-23 DIAGNOSIS — R29.898 WEAKNESS OF RIGHT HIP: Primary | ICD-10-CM

## 2018-03-23 PROCEDURE — 97110 THERAPEUTIC EXERCISES: CPT

## 2018-03-23 NOTE — PROGRESS NOTES
"                                                    Physical Therapy Daily Note     Name: Moshe Vallejo [facility] Number: [id number]  Diagnosis:   Encounter Diagnoses   Name Primary?    Pain of right hip joint     Weakness of right hip Yes     Physician: Gael Lyn Jr., *  Precautions: HTN, he had a R ROSIBEL (anterior approach) on 2/6/18   Visit #: 5 of 12  PTA Visit #: -  Time In: 847  Time Out: 950  Total Treatment Time 1:1: 63    Evaluation Date: 3/7/2018  Visit # authorized: 20  Authorization period: 12/31/2018  Plan of care Expiration: 4/18/2018  MD referral: Dr. Lyn    Subjective     Pt reports: he's doing well, no pain pre-tx. Pt reports he has been going to the gym and doing 200# weighted squats to 60 degrees hip flexion. Pt inquired about going golfing.    Objective     Moshe received individual therapeutic exercises to develop strength, endurance, ROM and core stabilization for 63 minutes including:  Bike 5 minutes  Bridges 15x BTB abduction, 15x ball squeeze  SL bridges 15x  Clams 3x10 GTB  SL hip abd 3x10  Supine HSS 3x30" R  Scooter hip flexor stretch off edge of mat 2x1 min R  Shuttle 3 C 3x10  Step ups L4 3x10  Lateral step ups L4 3x10  Wall squats with SB 2 x20  Steam boats 2x10  RTB B  Crab walks RTB  2 laps  Step downs lvl 3 x20  Forward/lat walks w/ MSC 20x  SLS and tandem stance (RLE back) on airex 2x 30"  PPT 3x10 NP  Anti-rotation walk outs Gcord 3x10 B NP    The patient received the following supervised modalities after being cleared for contradictions: refused ice    Written Home Exercises Provided: at eval  Pt demo good understanding of the education provided. Moshe demonstrated good return demonstration of activities.     Education provided re: to keep up with HEP and not to do too much. Pt educated on anterior versus posterior approach precautions and that he is allowed to progress hip extension ROM. Pt informed he should not perform ballistic stretching/rotation such as in golf at " this time.   Moshe verbalized good understanding of education provided.   No spiritual or educational barriers to learning provided    Assessment     Patient tolerated all treatment well. Added single leg bridges, wall squats, and step downs to program to improve RLE strength and stability. Pt required cues during step ups not to spring up onto step using LLE. Pt demos improved strength with all exercises and is making good progress toward goals. Pt will continue to benefit from skilled PT in order to increase strength and ROM and improve functional mobility.    Goals as follows:  Short Term GOALS: 3 weeks. Pt agrees with goals set.  1. Patient demonstrates independence with HEP. GOAL MET 3/23/218  2. Patient demonstrates independence with Postural Awareness. GOAL MET 3/23/18  3. Patient demonstrates independence with body mechanics. GOAL MET 3/23/18     Long Term GOALS: 6 weeks. Pt agrees with goals set.  1. Patient demonstrates increased R hip AROM to 10 degrees to improve tolerance to functional activities. (progressing, not met)  2. Patient demonstrates increased strength BLE's to 5/5 or greater to improve tolerance to functional activities. (progressing, not met)  3. Patient demonstrates improved overall function per FOTO Lumbar Survey to 33% or less. (progressing, not met)     Plan     Continue with established Plan of Care towards PT goals.    Therapist: Mia Bashir, PT  3/23/2018

## 2018-03-27 ENCOUNTER — CLINICAL SUPPORT (OUTPATIENT)
Dept: REHABILITATION | Facility: HOSPITAL | Age: 68
End: 2018-03-27
Payer: MEDICARE

## 2018-03-27 DIAGNOSIS — M25.551 PAIN OF RIGHT HIP JOINT: Primary | ICD-10-CM

## 2018-03-27 DIAGNOSIS — R29.898 WEAKNESS OF RIGHT HIP: ICD-10-CM

## 2018-03-27 PROCEDURE — 97110 THERAPEUTIC EXERCISES: CPT

## 2018-03-27 PROCEDURE — G8978 MOBILITY CURRENT STATUS: HCPCS | Mod: CJ

## 2018-03-27 PROCEDURE — G8979 MOBILITY GOAL STATUS: HCPCS | Mod: CJ

## 2019-06-11 ENCOUNTER — OFFICE VISIT (OUTPATIENT)
Dept: OPTOMETRY | Facility: CLINIC | Age: 69
End: 2019-06-11
Payer: COMMERCIAL

## 2019-06-11 DIAGNOSIS — H52.223 REGULAR ASTIGMATISM OF BOTH EYES: ICD-10-CM

## 2019-06-11 DIAGNOSIS — H52.4 PRESBYOPIA: Primary | ICD-10-CM

## 2019-06-11 DIAGNOSIS — H25.13 NS (NUCLEAR SCLEROSIS), BILATERAL: ICD-10-CM

## 2019-06-11 PROCEDURE — 92015 PR REFRACTION: ICD-10-PCS | Mod: S$GLB,,, | Performed by: OPTOMETRIST

## 2019-06-11 PROCEDURE — 92004 PR EYE EXAM, NEW PATIENT,COMPREHESV: ICD-10-PCS | Mod: S$GLB,,, | Performed by: OPTOMETRIST

## 2019-06-11 PROCEDURE — 99999 PR PBB SHADOW E&M-EST. PATIENT-LVL II: CPT | Mod: PBBFAC,,, | Performed by: OPTOMETRIST

## 2019-06-11 PROCEDURE — 92015 DETERMINE REFRACTIVE STATE: CPT | Mod: S$GLB,,, | Performed by: OPTOMETRIST

## 2019-06-11 PROCEDURE — 92004 COMPRE OPH EXAM NEW PT 1/>: CPT | Mod: S$GLB,,, | Performed by: OPTOMETRIST

## 2019-06-11 PROCEDURE — 99999 PR PBB SHADOW E&M-EST. PATIENT-LVL II: ICD-10-PCS | Mod: PBBFAC,,, | Performed by: OPTOMETRIST

## 2019-06-11 RX ORDER — OMEPRAZOLE 20 MG/1
CAPSULE, DELAYED RELEASE ORAL
COMMUNITY
Start: 2019-05-31

## 2019-06-11 RX ORDER — ESCITALOPRAM OXALATE 10 MG/1
TABLET ORAL
Refills: 3 | COMMUNITY
Start: 2019-05-21 | End: 2019-07-17

## 2019-06-11 NOTE — PROGRESS NOTES
HPI     Last eye exam was 2/18/15 with Dr Castillo.    Patient denies diplopia, headaches, flashes/floaters, pain, and   itching/burning/tearing.    Pt uses readers +2.75, did not bring them today  Says he has notices it is harder to read up close and far away is a little   harder to see.   Using any eye gtts? no        Last edited by Corrine Richardson on 6/11/2019  1:03 PM. (History)            Assessment /Plan     For exam results, see Encounter Report.          NS (nuclear sclerosis), bilateral              Mild, not visually significant, monitor  Hypertension              No retinopathy, monitor yearly     Presbyopia              Ok to continue with OTC readers, Rx specs if needed     RTC 1 year, sooner PRN

## 2019-07-17 ENCOUNTER — OFFICE VISIT (OUTPATIENT)
Dept: UROLOGY | Facility: CLINIC | Age: 69
End: 2019-07-17
Payer: MEDICARE

## 2019-07-17 VITALS
WEIGHT: 215 LBS | BODY MASS INDEX: 30.78 KG/M2 | DIASTOLIC BLOOD PRESSURE: 69 MMHG | HEART RATE: 81 BPM | SYSTOLIC BLOOD PRESSURE: 138 MMHG | HEIGHT: 70 IN

## 2019-07-17 DIAGNOSIS — C61 PC (PROSTATE CANCER): Primary | ICD-10-CM

## 2019-07-17 PROCEDURE — 99204 PR OFFICE/OUTPT VISIT, NEW, LEVL IV, 45-59 MIN: ICD-10-PCS | Mod: S$GLB,,, | Performed by: UROLOGY

## 2019-07-17 PROCEDURE — 1101F PR PT FALLS ASSESS DOC 0-1 FALLS W/OUT INJ PAST YR: ICD-10-PCS | Mod: CPTII,S$GLB,, | Performed by: UROLOGY

## 2019-07-17 PROCEDURE — 3075F PR MOST RECENT SYSTOLIC BLOOD PRESS GE 130-139MM HG: ICD-10-PCS | Mod: CPTII,S$GLB,, | Performed by: UROLOGY

## 2019-07-17 PROCEDURE — 3078F PR MOST RECENT DIASTOLIC BLOOD PRESSURE < 80 MM HG: ICD-10-PCS | Mod: CPTII,S$GLB,, | Performed by: UROLOGY

## 2019-07-17 PROCEDURE — 3075F SYST BP GE 130 - 139MM HG: CPT | Mod: CPTII,S$GLB,, | Performed by: UROLOGY

## 2019-07-17 PROCEDURE — 1101F PT FALLS ASSESS-DOCD LE1/YR: CPT | Mod: CPTII,S$GLB,, | Performed by: UROLOGY

## 2019-07-17 PROCEDURE — 99204 OFFICE O/P NEW MOD 45 MIN: CPT | Mod: S$GLB,,, | Performed by: UROLOGY

## 2019-07-17 PROCEDURE — 3078F DIAST BP <80 MM HG: CPT | Mod: CPTII,S$GLB,, | Performed by: UROLOGY

## 2019-07-17 RX ORDER — CHOLECALCIFEROL (VITAMIN D3) 25 MCG
1000 TABLET ORAL DAILY
COMMUNITY

## 2019-07-17 NOTE — H&P (VIEW-ONLY)
"Subjective:      Moshe Murphy is a 68 y.o. male who returns today regarding his     Referred by Dr Haynes for robotic prostatectomy.    Patient had a nodule on examination.  Previous biopsy was negative.  Recent PSA was slightly elevated at 3.15.  Repeat biopsy reveals a single focus of Shanna 7 3+ 4 adenocarcinoma involving 85% of both cores at the right base.    No erectile dysfunction.  No lower urinary tract symptoms.  No previous abdominal surgery    He is very active.  He works as  with the [employer].        The following portions of the patient's history were reviewed and updated as appropriate: allergies, current medications, past family history, past medical history, past social history, past surgical history and problem list.    Review of Systems  Pertinent items are noted in HPI.  A comprehensive multipoint review of systems was negative except as otherwise stated in the HPI.    Past Medical History:   Diagnosis Date    Acid reflux     History of right hip replacement 2016    Hyperlipidemia     Hypertension      Past Surgical History:   Procedure Laterality Date    ELBOW SURGERY      HIP REPLACEMENT ARTHROPLASTY  2016    INJECTION MAJOR JOINT Right 4/28/2017    [facility]    INJECTION-STEROID-EPIDURAL-LUMBAR N/A 3/31/2017    [facility]    KNEE ARTHROSCOPY      SHOULDER SURGERY      stents         Review of patient's allergies indicates:  No Known Allergies       Objective:   Vitals: /69   Pulse 81   Ht 5' 10" (1.778 m)   Wt 97.5 kg (215 lb)   BMI 30.85 kg/m²     Physical Exam   General: alert and oriented, no acute distress  Respiratory: clear to auscultation and Symmetric expansion, non-labored breathing  Cardiovascular: regular rate and rhythm, no murmurs, no peripheral edema  Abdomen:  No scars.  Small fascial defect at the umbilicus and soft, non " distended  Skin: normal coloration and turgor, no rashes, no suspicious skin lesions noted  Neuro: no gross deficits  Psych: normal judgment and insight, normal mood/affect and non-anxious  Prostate exam by Dr. Haynes reveals nodule with no gross extraprostatic disease  Physical Exam    Lab Review   Urinalysis demonstrates negative for all components  Lab Results   Component Value Date    WBC 7.44 01/27/2016    HGB 15.3 01/27/2016    HCT 42.5 01/27/2016    MCV 91 01/27/2016     01/27/2016     Lab Results   Component Value Date    CREATININE 1.1 01/27/2016    BUN 19 01/27/2016       Imaging  -  Assessment and Plan:   PC (prostate cancer)  Shanna 7 [id number]; psa 3.15  -     Case Request Operating Room: ROBOTIC PROSTATECTOMY; lymphadenectomy  -     Full code; Standing        We discussed his grade and stage of disease, life expectancy and active surveillance vs treatment.  We discussed the roles of surgery, radiation (external and brachytherapy), HIFU and Cryosurgery, hormonal therapy and chemotherapy in the treatment of prostate cancer.  We discussed brachytherapy and external radiation therapy and open and robotic surgery.  We discussed the risks and benefits of each. We discussed erectile dysfunction, urinary incontinence and bowel issues.  We discussed referral to radiation oncology to further discuss options.  I answered his questions.    Schedule robotic prostatectomy and lymphadenectomy August 8, 2019    We plan access in the left upper quadrant due to the small fascial defect at the umbilicus.  We will extract the specimen through this area and close the fascia primarily.    We will return his urologic care to Dr. Haynes once he has recovered from his surgery

## 2019-07-17 NOTE — PROGRESS NOTES
"Subjective:      Moshe Murphy is a 68 y.o. male who returns today regarding his     Referred by Dr Haynes for robotic prostatectomy.    Patient had a nodule on examination.  Previous biopsy was negative.  Recent PSA was slightly elevated at 3.15.  Repeat biopsy reveals a single focus of Shanna 7 3+ 4 adenocarcinoma involving 85% of both cores at the right base.    No erectile dysfunction.  No lower urinary tract symptoms.  No previous abdominal surgery    He is very active.  He works as  with the [employer].        The following portions of the patient's history were reviewed and updated as appropriate: allergies, current medications, past family history, past medical history, past social history, past surgical history and problem list.    Review of Systems  Pertinent items are noted in HPI.  A comprehensive multipoint review of systems was negative except as otherwise stated in the HPI.    Past Medical History:   Diagnosis Date    Acid reflux     History of right hip replacement 2016    Hyperlipidemia     Hypertension      Past Surgical History:   Procedure Laterality Date    ELBOW SURGERY      HIP REPLACEMENT ARTHROPLASTY  2016    INJECTION MAJOR JOINT Right 4/28/2017    [facility]    INJECTION-STEROID-EPIDURAL-LUMBAR N/A 3/31/2017    [facility]    KNEE ARTHROSCOPY      SHOULDER SURGERY      stents         Review of patient's allergies indicates:  No Known Allergies       Objective:   Vitals: /69   Pulse 81   Ht 5' 10" (1.778 m)   Wt 97.5 kg (215 lb)   BMI 30.85 kg/m²     Physical Exam   General: alert and oriented, no acute distress  Respiratory: clear to auscultation and Symmetric expansion, non-labored breathing  Cardiovascular: regular rate and rhythm, no murmurs, no peripheral edema  Abdomen:  No scars.  Small fascial defect at the umbilicus and soft, non " distended  Skin: normal coloration and turgor, no rashes, no suspicious skin lesions noted  Neuro: no gross deficits  Psych: normal judgment and insight, normal mood/affect and non-anxious  Prostate exam by Dr. Haynes reveals nodule with no gross extraprostatic disease  Physical Exam    Lab Review   Urinalysis demonstrates negative for all components  Lab Results   Component Value Date    WBC 7.44 01/27/2016    HGB 15.3 01/27/2016    HCT 42.5 01/27/2016    MCV 91 01/27/2016     01/27/2016     Lab Results   Component Value Date    CREATININE 1.1 01/27/2016    BUN 19 01/27/2016       Imaging  -  Assessment and Plan:   PC (prostate cancer)  Shanna 7 [id number]; psa 3.15  -     Case Request Operating Room: ROBOTIC PROSTATECTOMY; lymphadenectomy  -     Full code; Standing        We discussed his grade and stage of disease, life expectancy and active surveillance vs treatment.  We discussed the roles of surgery, radiation (external and brachytherapy), HIFU and Cryosurgery, hormonal therapy and chemotherapy in the treatment of prostate cancer.  We discussed brachytherapy and external radiation therapy and open and robotic surgery.  We discussed the risks and benefits of each. We discussed erectile dysfunction, urinary incontinence and bowel issues.  We discussed referral to radiation oncology to further discuss options.  I answered his questions.    Schedule robotic prostatectomy and lymphadenectomy August 8, 2019    We plan access in the left upper quadrant due to the small fascial defect at the umbilicus.  We will extract the specimen through this area and close the fascia primarily.    We will return his urologic care to Dr. Haynes once he has recovered from his surgery

## 2019-08-02 ENCOUNTER — ANESTHESIA EVENT (OUTPATIENT)
Dept: SURGERY | Facility: OTHER | Age: 69
DRG: 708 | End: 2019-08-02
Payer: MEDICARE

## 2019-08-07 ENCOUNTER — RESEARCH ENCOUNTER (OUTPATIENT)
Dept: RESEARCH | Facility: OTHER | Age: 69
End: 2019-08-07

## 2019-08-07 ENCOUNTER — HOSPITAL ENCOUNTER (OUTPATIENT)
Dept: PREADMISSION TESTING | Facility: OTHER | Age: 69
Discharge: HOME | DRG: 708 | End: 2019-08-07
Attending: UROLOGY
Payer: MEDICARE

## 2019-08-07 ENCOUNTER — TELEPHONE (OUTPATIENT)
Dept: UROLOGY | Facility: CLINIC | Age: 69
End: 2019-08-07

## 2019-08-07 VITALS
SYSTOLIC BLOOD PRESSURE: 147 MMHG | HEIGHT: 70 IN | BODY MASS INDEX: 30.35 KG/M2 | TEMPERATURE: 98 F | OXYGEN SATURATION: 95 % | DIASTOLIC BLOOD PRESSURE: 76 MMHG | WEIGHT: 212 LBS | HEART RATE: 52 BPM

## 2019-08-07 DIAGNOSIS — Z95.5 PRESENCE OF STENT IN CORONARY ARTERY: ICD-10-CM

## 2019-08-07 DIAGNOSIS — C61 PC (PROSTATE CANCER): ICD-10-CM

## 2019-08-07 LAB
ABO + RH BLD: NORMAL
ANION GAP SERPL CALC-SCNC: 10 MMOL/L (ref 8–16)
BASOPHILS # BLD AUTO: 0.1 K/UL (ref 0–0.2)
BASOPHILS NFR BLD: 1.4 % (ref 0–1.9)
BLD GP AB SCN CELLS X3 SERPL QL: NORMAL
BUN SERPL-MCNC: 21 MG/DL (ref 8–23)
CALCIUM SERPL-MCNC: 10 MG/DL (ref 8.7–10.5)
CHLORIDE SERPL-SCNC: 106 MMOL/L (ref 95–110)
CO2 SERPL-SCNC: 26 MMOL/L (ref 23–29)
CREAT SERPL-MCNC: 1.1 MG/DL (ref 0.5–1.4)
DIFFERENTIAL METHOD: ABNORMAL
EOSINOPHIL # BLD AUTO: 0.3 K/UL (ref 0–0.5)
EOSINOPHIL NFR BLD: 4.8 % (ref 0–8)
ERYTHROCYTE [DISTWIDTH] IN BLOOD BY AUTOMATED COUNT: 12.8 % (ref 11.5–14.5)
EST. GFR  (AFRICAN AMERICAN): >60 ML/MIN/1.73 M^2
EST. GFR  (NON AFRICAN AMERICAN): >60 ML/MIN/1.73 M^2
GLUCOSE SERPL-MCNC: 101 MG/DL (ref 70–110)
HCT VFR BLD AUTO: 43.9 % (ref 40–54)
HGB BLD-MCNC: 15.2 G/DL (ref 14–18)
IMM GRANULOCYTES # BLD AUTO: 0.02 K/UL (ref 0–0.04)
IMM GRANULOCYTES NFR BLD AUTO: 0.3 % (ref 0–0.5)
LYMPHOCYTES # BLD AUTO: 1.9 K/UL (ref 1–4.8)
LYMPHOCYTES NFR BLD: 27.3 % (ref 18–48)
MCH RBC QN AUTO: 31.4 PG (ref 27–31)
MCHC RBC AUTO-ENTMCNC: 34.6 G/DL (ref 32–36)
MCV RBC AUTO: 91 FL (ref 82–98)
MONOCYTES # BLD AUTO: 0.6 K/UL (ref 0.3–1)
MONOCYTES NFR BLD: 8.4 % (ref 4–15)
NEUTROPHILS # BLD AUTO: 4.1 K/UL (ref 1.8–7.7)
NEUTROPHILS NFR BLD: 57.8 % (ref 38–73)
NRBC BLD-RTO: 0 /100 WBC
PLATELET # BLD AUTO: 197 K/UL (ref 150–350)
PMV BLD AUTO: 10.7 FL (ref 9.2–12.9)
POTASSIUM SERPL-SCNC: 4.6 MMOL/L (ref 3.5–5.1)
RBC # BLD AUTO: 4.84 M/UL (ref 4.6–6.2)
SODIUM SERPL-SCNC: 142 MMOL/L (ref 136–145)
WBC # BLD AUTO: 7.06 K/UL (ref 3.9–12.7)

## 2019-08-07 PROCEDURE — 93010 ELECTROCARDIOGRAM REPORT: CPT | Mod: ,,, | Performed by: INTERNAL MEDICINE

## 2019-08-07 PROCEDURE — 93010 EKG 12-LEAD: ICD-10-PCS | Mod: ,,, | Performed by: INTERNAL MEDICINE

## 2019-08-07 PROCEDURE — 80048 BASIC METABOLIC PNL TOTAL CA: CPT

## 2019-08-07 PROCEDURE — 86850 RBC ANTIBODY SCREEN: CPT

## 2019-08-07 PROCEDURE — 93005 ELECTROCARDIOGRAM TRACING: CPT

## 2019-08-07 PROCEDURE — 85025 COMPLETE CBC W/AUTO DIFF WBC: CPT

## 2019-08-07 PROCEDURE — 36415 COLL VENOUS BLD VENIPUNCTURE: CPT

## 2019-08-07 RX ORDER — SODIUM CHLORIDE, SODIUM LACTATE, POTASSIUM CHLORIDE, CALCIUM CHLORIDE 600; 310; 30; 20 MG/100ML; MG/100ML; MG/100ML; MG/100ML
INJECTION, SOLUTION INTRAVENOUS CONTINUOUS
Status: CANCELLED | OUTPATIENT
Start: 2019-08-07

## 2019-08-07 RX ORDER — PREGABALIN 75 MG/1
75 CAPSULE ORAL
Status: CANCELLED | OUTPATIENT
Start: 2019-08-07 | End: 2019-08-07

## 2019-08-07 RX ORDER — FAMOTIDINE 20 MG/1
20 TABLET, FILM COATED ORAL
Status: CANCELLED | OUTPATIENT
Start: 2019-08-07 | End: 2019-08-07

## 2019-08-07 RX ORDER — ACETAMINOPHEN 500 MG
1000 TABLET ORAL
Status: CANCELLED | OUTPATIENT
Start: 2019-08-07 | End: 2019-08-07

## 2019-08-07 NOTE — ANESTHESIA PREPROCEDURE EVALUATION
08/07/2019  Moshe Murphy is a 69 y.o., male.    Anesthesia Evaluation    I have reviewed the Patient Summary Reports.    I have reviewed the Nursing Notes.   I have reviewed the Medications.     Review of Systems  Anesthesia Hx:  Spinal for hip surgery with PDPH and required blood patch. Denies Family Hx of Anesthesia complications.  Personal Hx of Anesthesia complications  Post Dural Puncture Headache and after spinal anesthesia  Social:  Non-Smoker    Hematology/Oncology:  Hematology Normal        Cardiovascular:   Hypertension CAD  CABG/stent  Coronary stents x 2 2014 without complication.  Now works out 5 days a week   Pulmonary:   Sleep Apnea    Renal/:  Renal/ Normal     Hepatic/GI:   GERD    Musculoskeletal:  Spine Disorders: lumbar Degenerative disease    Neurological:  Neurology Normal    Endocrine:   Hypothyroidism        Physical Exam  General:  Well nourished    Airway/Jaw/Neck:  Airway Findings: Mouth Opening: Normal Tongue: Normal  General Airway Assessment: Adult  Mallampati: II  TM Distance: Normal, at least 6 cm         Dental:  Dental Findings: In tact             Anesthesia Plan  Type of Anesthesia, risks & benefits discussed:  Anesthesia Type:  general  Patient's Preference:   Intra-op Monitoring Plan: standard ASA monitors  Intra-op Monitoring Plan Comments:   Post Op Pain Control Plan: multimodal analgesia and peripheral nerve block  Post Op Pain Control Plan Comments:   Induction:   IV  Beta Blocker:         Informed Consent: Patient understands risks and agrees with Anesthesia plan.  Questions answered. Anesthesia consent signed with patient.  ASA Score: 3     Day of Surgery Review of History & Physical:    H&P update referred to the surgeon.         Ready For Surgery From Anesthesia Perspective.

## 2019-08-07 NOTE — TELEPHONE ENCOUNTER
----- Message from Alyssa Goldstein sent at 8/7/2019  3:05 PM CDT -----  Contact: Pt   Name of Who is Calling: REMIGIO CABRAL [[id number]]    What is the request in detail:Pt is requesting a call  back regarding his procedure introductions.......  Please contact to further discuss and advise      Can the clinic reply by [facility]: No     What Number [facility] Back [facility]:  [phone]

## 2019-08-07 NOTE — PROGRESS NOTES
Pt was approached in Pre-Admit regarding participation in IRB protocol #2015.101.C, MT-9920 study. Pt was agreeable.     The Informed Consent Form (ICF) was reviewed with pt. The discussion included:   - participation is voluntary  - pt can change his mind about participating  - pt was informed that participation in this study would not preclude him from participating in any other research if offered  - specimens may be used by [facility] researchers, community researchers or research companies  - specimens collected include only those discussed with the patient at the time of consent and are indicated on the ICF   - specimens may be used for DNA, RNA or protein studies investigating biomarkers for diagnostic, prognostic or treatment purposes  - blood specimen will be collected from surgery after routine collections have been conducted  - excess tumor tissue will be collected from Pathology after their approval  - participation in Biobank study will not change amount of tissue removed  - all specimens released to researchers will be stripped of identifiers  - no personal medical information will be released to any parties outside of this research study  - there will be no other physical risks outside of those involved in standard of care procedure     Dr. Aguilar approved of patient's participation in the Biobank study. Pt did not have any questions. Pt willingly and independently signed ICF.    A copy of signed ICF was given to pt with instructions to call with any questions that may arise or if he should change his mind regarding participation in Biobank study.

## 2019-08-07 NOTE — DISCHARGE INSTRUCTIONS
PRE-ADMIT TESTING -  [phone]    [address]  [place] [facility]          Your surgery has been scheduled at [facility]. We are pleased to have the opportunity to serve you. For Further Information please call [phone].    On the day of surgery please report to the Information Desk on the 1st floor.    · CONTACT YOUR PHYSICIAN'S OFFICE THE DAY PRIOR TO YOUR SURGERY TO OBTAIN YOUR ARRIVAL TIME.     · The evening before surgery do not eat anything after 9 p.m. ( this includes hard candy, chewing gum and mints).  You may only have GATORADE, POWERADE AND WATER  from 9 p.m. until you leave your home.   DO NOT DRINK ANY LIQUIDS ON THE WAY TO THE HOSPITAL.      SPECIAL MEDICATION INSTRUCTIONS: TAKE medications checked off by the Anesthesiologist on your Medication List.    Angiogram Patients: Take medications as instructed by your physician, including aspirin.     Surgery Patients:    If you take ASPIRIN - Your PHYSICIAN/SURGEON will need to inform you IF/OR when you need to stop taking aspirin prior to your surgery.     Do Not take any medications containing IBUPROFEN.  Do Not Wear any make-up or dark nail polish   (especially eye make-up) to surgery. If you come to surgery with makeup on you will be required to remove the makeup or nail polish.    Do not shave your surgical area at least 5 days prior to your surgery. The surgical prep will be performed at the hospital according to Infection Control regulations.    Leave all valuables at home.   Do Not wear any jewelry or watches, including any metal in body piercings. Jewelry must be removed prior to coming to the hospital.  There is a possibility that rings that are unable to be removed may be cut off if they are on the surgical extremity.    Contact Lens must be removed before surgery. Either do not wear the contact lens or bring a case and solution for storage.  Please bring a container for eyeglasses or dentures as required.  Bring  any paperwork your physician has provided, such as consent forms,  history and physicals, doctor's orders, etc.   Bring comfortable clothes that are loose fitting to wear upon discharge. Take into consideration the type of surgery being performed.  Maintain your diet as advised per your physician the day prior to surgery.      Adequate rest the night before surgery is advised.   Park in the Parking lot behind the hospital or in the [facility] across the street from the parking lot. Parking is complimentary.  If you will be discharged the same day as your procedure, please arrange for a responsible adult to drive you home or to accompany you if traveling by taxi.   YOU WILL NOT BE PERMITTED TO DRIVE OR TO LEAVE THE HOSPITAL ALONE AFTER SURGERY.   It is strongly recommended that you arrange for someone to remain with you for the first 24 hrs following your surgery.    The Surgeon will speak to your family/visitor after your surgery regarding the outcome of your surgery and post op care.  The Surgeon may speak to you after your surgery, but there is a possibility you may not remember the details.  Please check with your family members regarding the conversation with the Surgeon.      Thank you for your cooperation.  The Staff of [facility].                Bathing Instructions with Hibiclens     Shower the evening before and morning of your procedure with Hibiclens:   Wash your face with water and your regular face wash/soap   Apply Hibiclens directly on your skin or on a wet washcloth and wash gently. When showering: Move away from the shower stream when applying Hibiclens to avoid rinsing off too soon.   Rinse thoroughly with warm water   Do not dilute Hibiclens         Dry off as usual, do not use any deodorant, powder, body lotions, perfume, after shave or cologne.

## 2019-08-08 ENCOUNTER — ANESTHESIA (OUTPATIENT)
Dept: SURGERY | Facility: OTHER | Age: 69
DRG: 708 | End: 2019-08-08
Payer: MEDICARE

## 2019-08-08 ENCOUNTER — HOSPITAL ENCOUNTER (INPATIENT)
Facility: OTHER | Age: 69
LOS: 1 days | Discharge: HOME | DRG: 708 | End: 2019-08-09
Attending: UROLOGY | Admitting: UROLOGY
Payer: MEDICARE

## 2019-08-08 DIAGNOSIS — C61 PC (PROSTATE CANCER): ICD-10-CM

## 2019-08-08 PROCEDURE — 11000001 HC ACUTE MED/SURG PRIVATE ROOM

## 2019-08-08 PROCEDURE — 88305 TISSUE SPECIMEN TO PATHOLOGY - SURGERY: ICD-10-PCS | Mod: 26,,, | Performed by: PATHOLOGY

## 2019-08-08 PROCEDURE — 25000003 PHARM REV CODE 250: Performed by: NURSE ANESTHETIST, CERTIFIED REGISTERED

## 2019-08-08 PROCEDURE — 63600175 PHARM REV CODE 636 W HCPCS: Performed by: ANESTHESIOLOGY

## 2019-08-08 PROCEDURE — 63600175 PHARM REV CODE 636 W HCPCS: Performed by: SPECIALIST

## 2019-08-08 PROCEDURE — 88331 PATH CONSLTJ SURG 1 BLK 1SPC: CPT | Mod: 26,,, | Performed by: PATHOLOGY

## 2019-08-08 PROCEDURE — 25000003 PHARM REV CODE 250: Performed by: ANESTHESIOLOGY

## 2019-08-08 PROCEDURE — 88307 TISSUE EXAM BY PATHOLOGIST: CPT | Mod: 26,,, | Performed by: PATHOLOGY

## 2019-08-08 PROCEDURE — 71000033 HC RECOVERY, INTIAL HOUR: Performed by: UROLOGY

## 2019-08-08 PROCEDURE — 88307 TISSUE SPECIMEN TO PATHOLOGY - SURGERY: ICD-10-PCS | Mod: 26,,, | Performed by: PATHOLOGY

## 2019-08-08 PROCEDURE — 63600175 PHARM REV CODE 636 W HCPCS: Performed by: NURSE ANESTHETIST, CERTIFIED REGISTERED

## 2019-08-08 PROCEDURE — 25000003 PHARM REV CODE 250: Performed by: STUDENT IN AN ORGANIZED HEALTH CARE EDUCATION/TRAINING PROGRAM

## 2019-08-08 PROCEDURE — 71000039 HC RECOVERY, EACH ADD'L HOUR: Performed by: UROLOGY

## 2019-08-08 PROCEDURE — P9045 ALBUMIN (HUMAN), 5%, 250 ML: HCPCS | Mod: JG | Performed by: NURSE ANESTHETIST, CERTIFIED REGISTERED

## 2019-08-08 PROCEDURE — 37000009 HC ANESTHESIA EA ADD 15 MINS: Performed by: UROLOGY

## 2019-08-08 PROCEDURE — 38571 LAPAROSCOPY LYMPHADENECTOMY: CPT | Mod: 51,,, | Performed by: UROLOGY

## 2019-08-08 PROCEDURE — 25000003 PHARM REV CODE 250: Performed by: SPECIALIST

## 2019-08-08 PROCEDURE — 27201423 OPTIME MED/SURG SUP & DEVICES STERILE SUPPLY: Performed by: UROLOGY

## 2019-08-08 PROCEDURE — A4216 STERILE WATER/SALINE, 10 ML: HCPCS | Performed by: NURSE ANESTHETIST, CERTIFIED REGISTERED

## 2019-08-08 PROCEDURE — 55866 PR LAP,PROSTATECTOMY,RADICAL,W/NERVE SPARE,INCL ROBOTIC: ICD-10-PCS | Mod: ,,, | Performed by: UROLOGY

## 2019-08-08 PROCEDURE — 55866 LAPS SURG PRST8ECT RPBIC RAD: CPT | Mod: ,,, | Performed by: UROLOGY

## 2019-08-08 PROCEDURE — 88305 TISSUE EXAM BY PATHOLOGIST: CPT | Performed by: PATHOLOGY

## 2019-08-08 PROCEDURE — 88331 PATH CONSLTJ SURG 1 BLK 1SPC: CPT | Performed by: PATHOLOGY

## 2019-08-08 PROCEDURE — 37000008 HC ANESTHESIA 1ST 15 MINUTES: Performed by: UROLOGY

## 2019-08-08 PROCEDURE — 88309 TISSUE SPECIMEN TO PATHOLOGY - SURGERY: ICD-10-PCS | Mod: 26,,, | Performed by: PATHOLOGY

## 2019-08-08 PROCEDURE — S0020 INJECTION, BUPIVICAINE HYDRO: HCPCS | Performed by: ANESTHESIOLOGY

## 2019-08-08 PROCEDURE — 88331 TISSUE SPECIMEN TO PATHOLOGY - SURGERY: ICD-10-PCS | Mod: 26,,, | Performed by: PATHOLOGY

## 2019-08-08 PROCEDURE — 88305 TISSUE EXAM BY PATHOLOGIST: CPT | Mod: 26,,, | Performed by: PATHOLOGY

## 2019-08-08 PROCEDURE — 36000713 HC OR TIME LEV V EA ADD 15 MIN: Performed by: UROLOGY

## 2019-08-08 PROCEDURE — 94761 N-INVAS EAR/PLS OXIMETRY MLT: CPT

## 2019-08-08 PROCEDURE — 38571 PR LAP,PELVIC LYMPHADENECTOMY: ICD-10-PCS | Mod: 51,,, | Performed by: UROLOGY

## 2019-08-08 PROCEDURE — 63600175 PHARM REV CODE 636 W HCPCS: Performed by: STUDENT IN AN ORGANIZED HEALTH CARE EDUCATION/TRAINING PROGRAM

## 2019-08-08 PROCEDURE — 88309 TISSUE EXAM BY PATHOLOGIST: CPT | Mod: 26,,, | Performed by: PATHOLOGY

## 2019-08-08 PROCEDURE — C9290 INJ, BUPIVACAINE LIPOSOME: HCPCS | Performed by: ANESTHESIOLOGY

## 2019-08-08 PROCEDURE — 25000003 PHARM REV CODE 250: Performed by: UROLOGY

## 2019-08-08 PROCEDURE — 36000712 HC OR TIME LEV V 1ST 15 MIN: Performed by: UROLOGY

## 2019-08-08 RX ORDER — ONDANSETRON 2 MG/ML
4 INJECTION INTRAMUSCULAR; INTRAVENOUS DAILY PRN
Status: DISCONTINUED | OUTPATIENT
Start: 2019-08-08 | End: 2019-08-08 | Stop reason: HOSPADM

## 2019-08-08 RX ORDER — PROPOFOL 10 MG/ML
VIAL (ML) INTRAVENOUS
Status: DISCONTINUED | OUTPATIENT
Start: 2019-08-08 | End: 2019-08-08

## 2019-08-08 RX ORDER — LEVOTHYROXINE SODIUM 25 UG/1
25 TABLET ORAL
Status: DISCONTINUED | OUTPATIENT
Start: 2019-08-09 | End: 2019-08-08 | Stop reason: SDUPTHER

## 2019-08-08 RX ORDER — NEOSTIGMINE METHYLSULFATE 1 MG/ML
INJECTION, SOLUTION INTRAVENOUS
Status: DISCONTINUED | OUTPATIENT
Start: 2019-08-08 | End: 2019-08-08

## 2019-08-08 RX ORDER — EPHEDRINE SULFATE 50 MG/ML
INJECTION, SOLUTION INTRAVENOUS
Status: DISCONTINUED | OUTPATIENT
Start: 2019-08-08 | End: 2019-08-08

## 2019-08-08 RX ORDER — ENOXAPARIN SODIUM 100 MG/ML
40 INJECTION SUBCUTANEOUS EVERY 24 HOURS
Status: DISCONTINUED | OUTPATIENT
Start: 2019-08-08 | End: 2019-08-09 | Stop reason: HOSPADM

## 2019-08-08 RX ORDER — OXYCODONE HYDROCHLORIDE 5 MG/1
5 TABLET ORAL EVERY 6 HOURS PRN
Status: DISCONTINUED | OUTPATIENT
Start: 2019-08-08 | End: 2019-08-09 | Stop reason: HOSPADM

## 2019-08-08 RX ORDER — ROCURONIUM BROMIDE 10 MG/ML
INJECTION, SOLUTION INTRAVENOUS
Status: DISCONTINUED | OUTPATIENT
Start: 2019-08-08 | End: 2019-08-08

## 2019-08-08 RX ORDER — MIDAZOLAM HYDROCHLORIDE 1 MG/ML
2 INJECTION INTRAMUSCULAR; INTRAVENOUS
Status: COMPLETED | OUTPATIENT
Start: 2019-08-08 | End: 2019-08-08

## 2019-08-08 RX ORDER — OXYCODONE HYDROCHLORIDE 5 MG/1
5 TABLET ORAL
Status: DISCONTINUED | OUTPATIENT
Start: 2019-08-08 | End: 2019-08-08 | Stop reason: HOSPADM

## 2019-08-08 RX ORDER — ACETAMINOPHEN 10 MG/ML
1000 INJECTION, SOLUTION INTRAVENOUS EVERY 8 HOURS
Status: COMPLETED | OUTPATIENT
Start: 2019-08-08 | End: 2019-08-09

## 2019-08-08 RX ORDER — ALBUMIN HUMAN 50 G/1000ML
SOLUTION INTRAVENOUS CONTINUOUS PRN
Status: DISCONTINUED | OUTPATIENT
Start: 2019-08-08 | End: 2019-08-08

## 2019-08-08 RX ORDER — LABETALOL HYDROCHLORIDE 5 MG/ML
INJECTION, SOLUTION INTRAVENOUS
Status: DISCONTINUED | OUTPATIENT
Start: 2019-08-08 | End: 2019-08-08

## 2019-08-08 RX ORDER — ONDANSETRON 8 MG/1
8 TABLET, ORALLY DISINTEGRATING ORAL EVERY 8 HOURS PRN
Status: DISCONTINUED | OUTPATIENT
Start: 2019-08-08 | End: 2019-08-09 | Stop reason: HOSPADM

## 2019-08-08 RX ORDER — PREGABALIN 75 MG/1
75 CAPSULE ORAL
Status: COMPLETED | OUTPATIENT
Start: 2019-08-08 | End: 2019-08-08

## 2019-08-08 RX ORDER — LOSARTAN POTASSIUM 50 MG/1
100 TABLET ORAL DAILY
Status: DISCONTINUED | OUTPATIENT
Start: 2019-08-08 | End: 2019-08-09 | Stop reason: HOSPADM

## 2019-08-08 RX ORDER — ATORVASTATIN CALCIUM 20 MG/1
20 TABLET, FILM COATED ORAL DAILY
Status: DISCONTINUED | OUTPATIENT
Start: 2019-08-08 | End: 2019-08-09 | Stop reason: HOSPADM

## 2019-08-08 RX ORDER — LEVOTHYROXINE SODIUM 25 UG/1
25 TABLET ORAL
Status: DISCONTINUED | OUTPATIENT
Start: 2019-08-08 | End: 2019-08-09 | Stop reason: HOSPADM

## 2019-08-08 RX ORDER — BUPIVACAINE HYDROCHLORIDE 2.5 MG/ML
INJECTION, SOLUTION INFILTRATION; PERINEURAL
Status: COMPLETED | OUTPATIENT
Start: 2019-08-08 | End: 2019-08-08

## 2019-08-08 RX ORDER — SODIUM CHLORIDE, SODIUM LACTATE, POTASSIUM CHLORIDE, CALCIUM CHLORIDE 600; 310; 30; 20 MG/100ML; MG/100ML; MG/100ML; MG/100ML
INJECTION, SOLUTION INTRAVENOUS CONTINUOUS
Status: DISCONTINUED | OUTPATIENT
Start: 2019-08-08 | End: 2019-08-08

## 2019-08-08 RX ORDER — PANTOPRAZOLE SODIUM 40 MG/1
40 TABLET, DELAYED RELEASE ORAL DAILY
Status: DISCONTINUED | OUTPATIENT
Start: 2019-08-08 | End: 2019-08-09 | Stop reason: HOSPADM

## 2019-08-08 RX ORDER — KETAMINE HYDROCHLORIDE 50 MG/ML
INJECTION, SOLUTION INTRAMUSCULAR; INTRAVENOUS
Status: DISCONTINUED | OUTPATIENT
Start: 2019-08-08 | End: 2019-08-08

## 2019-08-08 RX ORDER — ONDANSETRON 2 MG/ML
INJECTION INTRAMUSCULAR; INTRAVENOUS
Status: DISCONTINUED | OUTPATIENT
Start: 2019-08-08 | End: 2019-08-08

## 2019-08-08 RX ORDER — KETOROLAC TROMETHAMINE 30 MG/ML
15 INJECTION, SOLUTION INTRAMUSCULAR; INTRAVENOUS EVERY 6 HOURS
Status: DISCONTINUED | OUTPATIENT
Start: 2019-08-08 | End: 2019-08-09 | Stop reason: HOSPADM

## 2019-08-08 RX ORDER — DEXAMETHASONE SODIUM PHOSPHATE 4 MG/ML
INJECTION, SOLUTION INTRA-ARTICULAR; INTRALESIONAL; INTRAMUSCULAR; INTRAVENOUS; SOFT TISSUE
Status: DISCONTINUED | OUTPATIENT
Start: 2019-08-08 | End: 2019-08-08

## 2019-08-08 RX ORDER — SODIUM CHLORIDE 0.9 % (FLUSH) 0.9 %
3 SYRINGE (ML) INJECTION
Status: DISCONTINUED | OUTPATIENT
Start: 2019-08-08 | End: 2019-08-09 | Stop reason: HOSPADM

## 2019-08-08 RX ORDER — HYDROMORPHONE HYDROCHLORIDE 2 MG/ML
0.4 INJECTION, SOLUTION INTRAMUSCULAR; INTRAVENOUS; SUBCUTANEOUS EVERY 5 MIN PRN
Status: DISCONTINUED | OUTPATIENT
Start: 2019-08-08 | End: 2019-08-08 | Stop reason: HOSPADM

## 2019-08-08 RX ORDER — VECURONIUM BROMIDE FOR INJECTION 1 MG/ML
INJECTION, POWDER, LYOPHILIZED, FOR SOLUTION INTRAVENOUS
Status: DISCONTINUED | OUTPATIENT
Start: 2019-08-08 | End: 2019-08-08

## 2019-08-08 RX ORDER — PROMETHAZINE HYDROCHLORIDE 25 MG/1
25 TABLET ORAL EVERY 6 HOURS PRN
Status: DISCONTINUED | OUTPATIENT
Start: 2019-08-08 | End: 2019-08-09 | Stop reason: HOSPADM

## 2019-08-08 RX ORDER — DIPHENHYDRAMINE HYDROCHLORIDE 50 MG/ML
25 INJECTION INTRAMUSCULAR; INTRAVENOUS EVERY 6 HOURS PRN
Status: DISCONTINUED | OUTPATIENT
Start: 2019-08-08 | End: 2019-08-08 | Stop reason: HOSPADM

## 2019-08-08 RX ORDER — FENTANYL CITRATE 50 UG/ML
100 INJECTION, SOLUTION INTRAMUSCULAR; INTRAVENOUS EVERY 5 MIN PRN
Status: DISCONTINUED | OUTPATIENT
Start: 2019-08-08 | End: 2019-08-08 | Stop reason: ALTCHOICE

## 2019-08-08 RX ORDER — MEPERIDINE HYDROCHLORIDE 25 MG/ML
12.5 INJECTION INTRAMUSCULAR; INTRAVENOUS; SUBCUTANEOUS ONCE AS NEEDED
Status: DISCONTINUED | OUTPATIENT
Start: 2019-08-08 | End: 2019-08-08 | Stop reason: HOSPADM

## 2019-08-08 RX ORDER — FAMOTIDINE 20 MG/1
20 TABLET, FILM COATED ORAL
Status: COMPLETED | OUTPATIENT
Start: 2019-08-08 | End: 2019-08-08

## 2019-08-08 RX ORDER — ACETAMINOPHEN 500 MG
1000 TABLET ORAL
Status: COMPLETED | OUTPATIENT
Start: 2019-08-08 | End: 2019-08-08

## 2019-08-08 RX ORDER — GLYCOPYRROLATE 0.2 MG/ML
INJECTION INTRAMUSCULAR; INTRAVENOUS
Status: DISCONTINUED | OUTPATIENT
Start: 2019-08-08 | End: 2019-08-08

## 2019-08-08 RX ORDER — LIDOCAINE HCL/PF 100 MG/5ML
SYRINGE (ML) INTRAVENOUS
Status: DISCONTINUED | OUTPATIENT
Start: 2019-08-08 | End: 2019-08-08

## 2019-08-08 RX ORDER — SODIUM CHLORIDE, SODIUM LACTATE, POTASSIUM CHLORIDE, CALCIUM CHLORIDE 600; 310; 30; 20 MG/100ML; MG/100ML; MG/100ML; MG/100ML
INJECTION, SOLUTION INTRAVENOUS CONTINUOUS
Status: DISCONTINUED | OUTPATIENT
Start: 2019-08-08 | End: 2019-08-09

## 2019-08-08 RX ADMIN — SODIUM CHLORIDE, SODIUM LACTATE, POTASSIUM CHLORIDE, AND CALCIUM CHLORIDE: .6; .31; .03; .02 INJECTION, SOLUTION INTRAVENOUS at 11:08

## 2019-08-08 RX ADMIN — LOSARTAN POTASSIUM 100 MG: 50 TABLET, FILM COATED ORAL at 09:08

## 2019-08-08 RX ADMIN — LIDOCAINE HYDROCHLORIDE 60 MG: 20 INJECTION, SOLUTION INTRAVENOUS at 07:08

## 2019-08-08 RX ADMIN — SODIUM CHLORIDE, SODIUM LACTATE, POTASSIUM CHLORIDE, AND CALCIUM CHLORIDE: .6; .31; .03; .02 INJECTION, SOLUTION INTRAVENOUS at 03:08

## 2019-08-08 RX ADMIN — OXYCODONE HYDROCHLORIDE 5 MG: 5 TABLET ORAL at 12:08

## 2019-08-08 RX ADMIN — EPHEDRINE SULFATE 10 MG: 50 INJECTION INTRAMUSCULAR; INTRAVENOUS; SUBCUTANEOUS at 10:08

## 2019-08-08 RX ADMIN — SODIUM CHLORIDE, SODIUM LACTATE, POTASSIUM CHLORIDE, AND CALCIUM CHLORIDE: 600; 310; 30; 20 INJECTION, SOLUTION INTRAVENOUS at 06:08

## 2019-08-08 RX ADMIN — ONDANSETRON 4 MG: 2 INJECTION INTRAMUSCULAR; INTRAVENOUS at 10:08

## 2019-08-08 RX ADMIN — FAMOTIDINE 20 MG: 20 TABLET, FILM COATED ORAL at 05:08

## 2019-08-08 RX ADMIN — ENOXAPARIN SODIUM 40 MG: 100 INJECTION SUBCUTANEOUS at 06:08

## 2019-08-08 RX ADMIN — KETOROLAC TROMETHAMINE 15 MG: 30 INJECTION, SOLUTION INTRAMUSCULAR at 06:08

## 2019-08-08 RX ADMIN — LIDOCAINE HYDROCHLORIDE 40 MG: 20 INJECTION, SOLUTION INTRAVENOUS at 08:08

## 2019-08-08 RX ADMIN — ALBUMIN (HUMAN): 2.5 SOLUTION INTRAVENOUS at 08:08

## 2019-08-08 RX ADMIN — NEOSTIGMINE METHYLSULFATE 5 MG: 1 INJECTION INTRAVENOUS at 10:08

## 2019-08-08 RX ADMIN — PROPOFOL 30 MG: 10 INJECTION, EMULSION INTRAVENOUS at 10:08

## 2019-08-08 RX ADMIN — ROCURONIUM BROMIDE 50 MG: 10 INJECTION, SOLUTION INTRAVENOUS at 07:08

## 2019-08-08 RX ADMIN — ACETAMINOPHEN 1000 MG: 10 INJECTION, SOLUTION INTRAVENOUS at 03:08

## 2019-08-08 RX ADMIN — KETAMINE HYDROCHLORIDE 25 MG: 50 INJECTION, SOLUTION INTRAMUSCULAR; INTRAVENOUS at 07:08

## 2019-08-08 RX ADMIN — IBUPROFEN 800 MG: 800 INJECTION INTRAVENOUS at 06:08

## 2019-08-08 RX ADMIN — DEXAMETHASONE SODIUM PHOSPHATE 8 MG: 4 INJECTION, SOLUTION INTRAMUSCULAR; INTRAVENOUS at 07:08

## 2019-08-08 RX ADMIN — BUPIVACAINE 20 ML: 13.3 INJECTION, SUSPENSION, LIPOSOMAL INFILTRATION at 06:08

## 2019-08-08 RX ADMIN — KETOROLAC TROMETHAMINE 15 MG: 30 INJECTION, SOLUTION INTRAMUSCULAR at 11:08

## 2019-08-08 RX ADMIN — LABETALOL HYDROCHLORIDE 5 MG: 5 INJECTION, SOLUTION INTRAVENOUS at 07:08

## 2019-08-08 RX ADMIN — PREGABALIN 75 MG: 75 CAPSULE ORAL at 05:08

## 2019-08-08 RX ADMIN — ACETAMINOPHEN 1000 MG: 500 TABLET, FILM COATED ORAL at 05:08

## 2019-08-08 RX ADMIN — CARBOXYMETHYLCELLULOSE SODIUM 2 DROP: 2.5 SOLUTION/ DROPS OPHTHALMIC at 07:08

## 2019-08-08 RX ADMIN — PANTOPRAZOLE SODIUM 40 MG: 40 TABLET, DELAYED RELEASE ORAL at 03:08

## 2019-08-08 RX ADMIN — SODIUM CHLORIDE 0.5 MCG/KG/HR: 9 INJECTION, SOLUTION INTRAMUSCULAR; INTRAVENOUS; SUBCUTANEOUS at 07:08

## 2019-08-08 RX ADMIN — GLYCOPYRROLATE 0.2 MG: 0.2 INJECTION, SOLUTION INTRAMUSCULAR; INTRAVENOUS at 08:08

## 2019-08-08 RX ADMIN — SODIUM CHLORIDE, SODIUM LACTATE, POTASSIUM CHLORIDE, AND CALCIUM CHLORIDE: 600; 310; 30; 20 INJECTION, SOLUTION INTRAVENOUS at 09:08

## 2019-08-08 RX ADMIN — VECURONIUM BROMIDE FOR INJECTION 2 MG: 1 INJECTION, POWDER, LYOPHILIZED, FOR SOLUTION INTRAVENOUS at 09:08

## 2019-08-08 RX ADMIN — VECURONIUM BROMIDE FOR INJECTION 3 MG: 1 INJECTION, POWDER, LYOPHILIZED, FOR SOLUTION INTRAVENOUS at 08:08

## 2019-08-08 RX ADMIN — PROPOFOL 200 MG: 10 INJECTION, EMULSION INTRAVENOUS at 07:08

## 2019-08-08 RX ADMIN — ATORVASTATIN CALCIUM 20 MG: 20 TABLET, FILM COATED ORAL at 09:08

## 2019-08-08 RX ADMIN — GLYCOPYRROLATE 0.6 MG: 0.2 INJECTION, SOLUTION INTRAMUSCULAR; INTRAVENOUS at 10:08

## 2019-08-08 RX ADMIN — MIDAZOLAM HYDROCHLORIDE 4 MG: 1 INJECTION, SOLUTION INTRAMUSCULAR; INTRAVENOUS at 06:08

## 2019-08-08 RX ADMIN — BUPIVACAINE HYDROCHLORIDE 40 ML: 2.5 INJECTION, SOLUTION INFILTRATION; PERINEURAL at 06:08

## 2019-08-08 RX ADMIN — ALBUMIN (HUMAN): 2.5 SOLUTION INTRAVENOUS at 07:08

## 2019-08-08 RX ADMIN — LEVOTHYROXINE SODIUM 25 MCG: 25 TABLET ORAL at 03:08

## 2019-08-08 RX ADMIN — GLYCOPYRROLATE 0.2 MG: 0.2 INJECTION, SOLUTION INTRAMUSCULAR; INTRAVENOUS at 07:08

## 2019-08-08 RX ADMIN — ACETAMINOPHEN 1000 MG: 10 INJECTION, SOLUTION INTRAVENOUS at 09:08

## 2019-08-08 NOTE — ANESTHESIA PROCEDURE NOTES
TAP blocks    Patient location during procedure: holding area   Block not for primary anesthetic.  Reason for block: at surgeon's request and post-op pain management   Post-op Pain Location: Abdominal pain  Start time: 8/8/2019 6:36 AM  Timeout: 8/8/2019 6:35 AM   End time: 8/8/2019 6:49 AM    Staffing  Authorizing Provider: Lianne King MD  Performing Provider: Lianne King MD    Preanesthetic Checklist  Completed: patient identified, site marked, surgical consent, pre-op evaluation, timeout performed, IV checked, risks and benefits discussed and monitors and equipment checked  Peripheral Block  Patient position: supine  Prep: ChloraPrep  Patient monitoring: heart rate, cardiac monitor, continuous pulse ox and frequent blood pressure checks  Block type: TAP  Laterality: bilateral  Injection technique: single shot  Needle  Needle type: short-bevel   Needle gauge: 22 G  Needle length: 3.5 in  Needle localization: nerve stimulator and ultrasound guidance   -ultrasound image captured on disc.  Assessment  Injection assessment: negative aspiration, negative parasthesia and local visualized surrounding nerve  Paresthesia pain: none  Heart rate change: no  Slow fractionated injection: yes  Additional Notes  Also exparel 20cc injected

## 2019-08-08 NOTE — TRANSFER OF CARE
"Anesthesia Transfer of Care Note    Patient: Moshe Murphy    Procedure(s) Performed: Procedure(s) (LRB):  ROBOTIC PROSTATECTOMY; lymphadenectomy (N/A)    Patient location: PACU    Anesthesia Type: general    Transport from OR: Transported from OR on 2-3 L/min O2 by NC with adequate spontaneous ventilation    Post pain: adequate analgesia    Post assessment: no apparent anesthetic complications    Post vital signs: stable    Level of consciousness: awake and alert    Nausea/Vomiting: no nausea/vomiting    Complications: none    Transfer of care protocol was followed      Last vitals:   Visit Vitals  BP (!) 168/84 (BP Location: Left arm, Patient Position: Sitting)   Pulse (P) 62   Temp (P) 36.6 °C (97.9 °F) (Axillary)   Resp (P) 20   Ht 5' 10" (1.778 m)   Wt 96.2 kg (212 lb)   SpO2 (P) 96%   BMI 30.42 kg/m²     "

## 2019-08-08 NOTE — OR NURSING
"Pt resting with eyes closed, awakens easily to verbal stimuli. C/o "urge to pee", rates 3/10, no c/o nausea and VSS, ready for transfer to inpatient room. Wife updated and sent to room 346 and report called to MAGDALENA Carvalho  "

## 2019-08-08 NOTE — PLAN OF CARE
Problem: Adult Inpatient Plan of Care  Goal: Plan of Care Review  Outcome: Ongoing (interventions implemented as appropriate)  No significant events this shift. Remains free from fall, injury, and skin breakdown.VSS stable on RA and afebrile. Positions self independently. Has ambulated this shift with assist; tolerated well with no c/o pain, SOB, or N/V. Neuro checks WDL. TEDs/SCDs maintained.Tolerating ordered diet. IV site WNL. Plan of care reviewed with patient and all questions answered. Bed low, locked w/ bed alarm on. Call light within reach. Purposeful rounding performed. No other complaints at this time.

## 2019-08-08 NOTE — NURSING
Pt arrived to floor via stretcher with MAGDALENA Young and transferred to bed. AAO x4. VSS on RA and afebrile. IVF started, SCDs applied, oriented to room, call light placed within reach, bed low and locked, and family at bedside. No complaints of pain at this time. Lopez noted draining light pink urine to gravity. 7 lap sites with dermabond noted to ABD, CDI. No acute distress noted at this time. Wife at bedside. Will continue to monitor.

## 2019-08-08 NOTE — OP NOTE
[facility]  Surgery Department  Operative Note    SUMMARY     Date of Procedure: 8/8/2019     Procedure: Procedure(s) (LRB):  ROBOTIC PROSTATECTOMY; lymphadenectomy (N/A)     Surgeon(s) and Role:     * Deep Aguilar MD - Primary        Assistant: Serena CASTILLO  No qualified resident available    Pre-Operative Diagnosis: PC (prostate cancer) [C61]  Gwinn 7 [id number]; psa 3.15  Post-Operative Diagnosis: Post-Op Diagnosis Codes:     * PC (prostate cancer) [C61]  Pathology pending  Anesthesia: General    Complications: No    Estimated Blood Loss (EBL): 300 mL           Specimens:   Specimen (12h ago, onward)    Start     Ordered    08/08/19 1029  Specimen to Pathology - Surgery  Once     Comments:  2. RIGHT PELVIC LYMPH NODES3. LEFT PELVIC LYMPH NODES4. PROSTATE (BIOBANK)     Start Status     08/08/19 1029 Collected (08/08/19 1029) Order ID: [id number]       08/08/19 1029    08/08/19 0947  Specimen to Pathology - Surgery  Once     Comments:  1.  Right base margin (sent for frozen)     Start Status     08/08/19 0947 Collected (08/08/19 0947) Order ID: [id number]       08/08/19 0947                  Condition: Good    Disposition: PACU - hemodynamically stable.    Attestation: I performed the procedure.    History:  6-year-old male with a elevated PSA of 3.15 and a nodule at the right base.  Prostate biopsy with 12 cores was negative.  Repeat limited biopsy of the nodule revealed Shanna 7 prostatic adenocarcinoma.  The patient has good erectile function and no significant lower urinary tract symptoms.  He was referred by Dr. Richard Haynes.  After discussing all his available options he has chosen robotic prostatectomy.  Informed consent obtained.    Procedure In Detail:  The patient brought to operating room and underwent general endotracheal anesthesia.  Sterile prep and drape was performed.  An orogastric tube was placed by anesthesia and a Lopez catheter was placed on the field.  There is  a small fascial defect at the umbilicus therefore primary access is obtained in the left upper quadrant with a Veress needle technique.  The needle irrigate and aspirate without abnormality and a drop test is negative.  The abdomen is insufflated with low opening pressures.  A 5 mm dilating trocars placed with the visual obturator.  The abdomen is inspected.  There is a small amount of CO2 and the omentum but no evidence of any injuries with primary access.  The remaining trocars were placed under direct vision.  Four 8 mm metallic robotic trocars are placed in line with the umbilicus.  A dilating 5 mm air seal trocars placed in the right upper quadrant and a dilating 12 mm trocars placed in the right lower quadrant.  Patient was placed in Trendelenburg position and the robot is docked.    A few adhesions of the sigmoid colon to the left pelvic sidewall taken down sharply and the rectum was retracted.  This requires about 15 min of additional dissection.  The peritoneum in the pouch of Keny was incised and the vasa and seminal vesicles were dissected.  Energy is avoided lateral to the seminal vesicles clips and scissors your utilized.  Dissection was performed between the layers of the recto prostatic fascia.  Dissection was carried laterally to the art rectal fat.  With retraction the left seminal vesicle slightly  from the base of the prostate.  The same occurred with the right seminal vesicle.  The urachus was divided and the medial umbilical ligaments were divided.  The peritoneum was incised along the iliac vessels.  Bilateral pelvic lymphadenectomy were performed with a combination of clips scissors and the robotic vessel sealing device.  The lateral border of dissection is iliac vessels the proximal border is near the takeoff of the internal iliac vessels the medial border is the bladder in the distal blood border is the inguinal ligament.  Care was taken to preserve the ureters and obturator  nerves.  On both sides the endopelvic fascia is incised and the pubic prostatic ligaments were incised.  On the left side there is an accessory obturator artery which was preserved.  Early release of the neurovascular bundles was performed.  The deep dorsal vein of the prostate is ligated with 1.  Vicryl suture.  The base of the prostate was divided from the base of the bladder. The ureteral orifices are identified and preserved.  The previously dissected Vasa and seminal vesicles were brought anteriorly.  The prostatic pedicles or dissected.  Anteriorly the pedicles are controlled with the robotic vessel sealing device.  Posteriorly and closer to the neurovascular bundles energy is avoided and clips and scissors were utilized.  The neurovascular bundles were preserved.  On the right side there appeared to be some prostatic tissue within the periprosthetic tissue.  This is sent for frozen section and there is no evidence of prostatic tissue or malignancy.  The dorsal vein of the prostate is again ligated with a figure-of-eight 1.  Vicryl suture passing the ends of the suture through the pubic symphysis and utilizing  ties.  The deep dorsal vein was divided.  The urethra was dissected the urethra was divided and the rectal urethralis ligaments divided.  There appeared to be a tiny amount of residual prostatic tissue anteriorly at the apex.  Resecting this would have required sacrificing the accessory pudendal artery.  Given the negative biopsies in this area and the low PSA, I chose not to resect this.  The specimens was inspected and placed in Endo-Catch sac.  There was no visible evidence of positive margins.  Pressure was lowered.  There is good hemostasis.  A running anastomosis is performed with 2 0 Monocryl over Surgicel bolsters.  Care was taken to preserve the ureteral orifices.  A new Lopez catheter was placed and the anastomotic sutures tied.  There is no leakage from the anastomosis.    The 12 mm  trocar site was closed with Vicryl suture and the suture ease device.  The specimen is extracted after the robot is undocked.  The fascia at the supraumbilical extraction site is closed with interrupted 1.  PDS sutures.  Prior to the procedure, the anesthesia team had performed a TAP block.  The abdomen is again insufflated.  The closure was inspected and palpated and found to be intact.  The remaining trocars removed at low pressure there was good hemostasis.  The wound irrigated.  All counts were correct.  The skin incisions were closed with absorbable suture and Dermabond.  The patient tolerated the procedure well

## 2019-08-08 NOTE — ANESTHESIA POSTPROCEDURE EVALUATION
Anesthesia Post Evaluation    Patient: Moshe Murphy    Procedure(s) Performed: Procedure(s) (LRB):  ROBOTIC PROSTATECTOMY (N/A)  XI ROBOTIC LYMPHADENECTOMY (Bilateral)    Final Anesthesia Type: general  Patient location during evaluation: PACU  Patient participation: Yes- Able to Participate  Level of consciousness: awake and alert  Post-procedure vital signs: reviewed and stable  Pain management: adequate  Airway patency: patent  PONV status at discharge: No PONV  Anesthetic complications: no      Cardiovascular status: blood pressure returned to baseline and stable  Respiratory status: unassisted, spontaneous ventilation and room air  Hydration status: euvolemic  Follow-up not needed.          Vitals Value Taken Time   /67 8/8/2019 12:43 PM   Temp 36.6 °C (97.8 °F) 8/8/2019 12:43 PM   Pulse 56 8/8/2019 12:43 PM   Resp 16 8/8/2019 12:43 PM   SpO2 91 % 8/8/2019 12:43 PM         Event Time     Out of Recovery 12:30:33          Pain/Angela Score: Pain Rating Prior to Med Admin: 5 (8/8/2019 12:05 PM)  Pain Rating Post Med Admin: 3 (8/8/2019 12:19 PM)  Angela Score: 8 (8/8/2019 12:06 PM)

## 2019-08-08 NOTE — INTERVAL H&P NOTE
The patient has been examined and the H&P has been reviewed:    I concur with the findings and no changes have occurred since H&P was written.   Patient has held ASA for one week.    Anesthesia/Surgery risks, benefits and alternative options discussed and understood by patient/family.          Active Hospital Problems    Diagnosis  POA    PC (prostate cancer) [C61]  Yes      Resolved Hospital Problems   No resolved problems to display.

## 2019-08-08 NOTE — PROGRESS NOTES
post op    Feel well  Eager to go home and go to gym  Af vss  Ab soft and benign  Urine clear    Sp robotic prostatectomy and PLND    Routine post op  Discussed operative findings  Hopefully dc home tomorrow  Nurse visit for voiding trial Friday 8/16; cont vasquez until then  Avoid narcotics  heplock IVF when cat po well  Ambulate  [facility] block [facility]  [facility]  [facility]

## 2019-08-09 ENCOUNTER — TELEPHONE (OUTPATIENT)
Dept: UROLOGY | Facility: CLINIC | Age: 69
End: 2019-08-09

## 2019-08-09 VITALS
HEART RATE: 53 BPM | RESPIRATION RATE: 16 BRPM | OXYGEN SATURATION: 97 % | BODY MASS INDEX: 30.35 KG/M2 | HEIGHT: 70 IN | WEIGHT: 212 LBS | TEMPERATURE: 97 F | SYSTOLIC BLOOD PRESSURE: 135 MMHG | DIASTOLIC BLOOD PRESSURE: 73 MMHG

## 2019-08-09 PROCEDURE — 25000003 PHARM REV CODE 250: Performed by: UROLOGY

## 2019-08-09 PROCEDURE — 63600175 PHARM REV CODE 636 W HCPCS: Performed by: STUDENT IN AN ORGANIZED HEALTH CARE EDUCATION/TRAINING PROGRAM

## 2019-08-09 PROCEDURE — 25000003 PHARM REV CODE 250: Performed by: STUDENT IN AN ORGANIZED HEALTH CARE EDUCATION/TRAINING PROGRAM

## 2019-08-09 RX ORDER — OXYCODONE AND ACETAMINOPHEN 5; 325 MG/1; MG/1
1 TABLET ORAL EVERY 4 HOURS PRN
Qty: 20 TABLET | Refills: 0 | Status: SHIPPED | OUTPATIENT
Start: 2019-08-09 | End: 2019-08-15

## 2019-08-09 RX ORDER — POLYETHYLENE GLYCOL 3350 17 G/17G
17 POWDER, FOR SOLUTION ORAL DAILY
Qty: 510 G | Refills: 0 | Status: SHIPPED | OUTPATIENT
Start: 2019-08-09 | End: 2019-08-15

## 2019-08-09 RX ADMIN — LEVOTHYROXINE SODIUM 25 MCG: 25 TABLET ORAL at 05:08

## 2019-08-09 RX ADMIN — ACETAMINOPHEN 1000 MG: 10 INJECTION, SOLUTION INTRAVENOUS at 05:08

## 2019-08-09 RX ADMIN — ATORVASTATIN CALCIUM 20 MG: 20 TABLET, FILM COATED ORAL at 09:08

## 2019-08-09 RX ADMIN — LOSARTAN POTASSIUM 100 MG: 50 TABLET, FILM COATED ORAL at 09:08

## 2019-08-09 RX ADMIN — KETOROLAC TROMETHAMINE 15 MG: 30 INJECTION, SOLUTION INTRAMUSCULAR at 05:08

## 2019-08-09 RX ADMIN — PANTOPRAZOLE SODIUM 40 MG: 40 TABLET, DELAYED RELEASE ORAL at 09:08

## 2019-08-09 NOTE — DISCHARGE SUMMARY
[facility]  Urology  Discharge Summary      Patient Name: Moshe Murphy  MRN: [id number]  Admission Date: 8/8/2019  Hospital Length of Stay: 1 days  Discharge Date and Time: 8/9/2019 12:54 PM  Attending Physician: Deep Aguilar MD  Discharging Provider: Errol Asencio MD  Primary Care Physician: Srinath Damon MD    HPI:     Mr. Murphy is a 69 year old male referred by Dr Haynes for robotic prostatectomy.   Patient had a nodule on examination.  Previous biopsy was negative.  Recent PSA was slightly elevated at 3.15.  Repeat biopsy reveals a single focus of Shanna 7 3+ 4 adenocarcinoma involving 85% of both cores at the right base.     No erectile dysfunction.  No lower urinary tract symptoms.  No previous abdominal surgery     He is very active.  He works as  with the [employer].    Procedure(s) (LRB):  ROBOTIC PROSTATECTOMY (N/A)  XI ROBOTIC LYMPHADENECTOMY (Bilateral)     Indwelling Lines/Drains at time of discharge:   Lines/Drains/Airways     Drain                 Urethral Catheter 08/08/19 0730 20 Fr. 1 day                Hospital Course (synopsis of major diagnoses, care, treatment, and services provided during the course of the hospital stay):     On 8/8/9 the patient underwent robotic prostatectomy. He was kept inpatient overnight for postoperative monitoring. He recovered well and was discharged the following day (8/9/19) with a Lopez. He will follow up in clinic for a voiding trial.     Consults:     Significant Diagnostic Studies:     Pending Diagnostic Studies:     None          Final Active Diagnoses:    Diagnosis Date Noted POA    PRINCIPAL PROBLEM:  PC (prostate cancer) [C61] 08/08/2019 Yes      Problems Resolved During this Admission:       Discharged Condition: good    Disposition: Home or Self Care    Follow Up:  Follow-up Information     [facility] [address] On 8/16/2019.    Specialty:  Urology  Contact information:  [address], [address]  [facility] [address]  [phone]  Additional information:  Urology - [facility], 6th Floor [address]   Please park in the [place]               Patient Instructions:      Diet general     Medications:  Reconciled Home Medications:      Medication List      START taking these medications    oxyCODONE-acetaminophen 5-325 mg per tablet  Commonly known as:  PERCOCET  Take 1 tablet by mouth every 4 (four) hours as needed for Pain.     polyethylene glycol 17 gram/dose powder  Commonly known as:  GLYCOLAX  Take 17 g by mouth once daily.        CONTINUE taking these medications    aspirin 81 MG Chew  Take 81 mg by mouth once daily. Stopped one week ago     atorvastatin 20 MG tablet  Commonly known as:  LIPITOR  20 mg once daily.     levothyroxine 50 MCG tablet  Commonly known as:  SYNTHROID  Take 25 mcg by mouth once daily.     losartan 100 MG tablet  Commonly known as:  COZAAR  Take 100 mg by mouth once daily.     omeprazole 20 MG capsule  Commonly known as:  PRILOSEC     vitamin D 1000 units Tab  Commonly known as:  VITAMIN D3  Take 1,000 Units by mouth once daily. Stopped one week ago            Errol Asencio MD  Urology  [facility]

## 2019-08-09 NOTE — PLAN OF CARE
Problem: Adult Inpatient Plan of Care  Goal: Plan of Care Review  Outcome: Ongoing (interventions implemented as appropriate)  No significant events overnight. Remains free from fall, injury, and skin breakdown. Fc intact. VSS on RA throughout the night. Positions self independently. Ambulating in hallway multiple times this shift. cat well. No c/o of pain, sob, or n/v.   7 lap sites to abdomen cdi.  TEDs/SCDs in place. Plan of care reviewed with patient and all questions answered. Bed low, locked w/ bed alarm on. Call light within reach. Purposeful rounding performed. Resting comfortably in bed, no other complaints at this time.

## 2019-08-09 NOTE — SUBJECTIVE & OBJECTIVE
Interval History: Underwent RALP yesterday, tolerated well. Ambulating, tolerating diet, pain and nausea controlled. UOP excellent. Not yet passing flatus, no BM.     Review of Systems  Objective:     Temp:  [96.9 °F (36.1 °C)-98.2 °F (36.8 °C)] 96.9 °F (36.1 °C)  Pulse:  [53-63] 53  Resp:  [16-20] 16  SpO2:  [91 %-97 %] 97 %  BP: (110-139)/(60-73) 135/73     Body mass index is 30.42 kg/m².           Drains     Drain                 Urethral Catheter 08/08/19 0730 20 Fr. 1 day                Physical Exam   Constitutional: He is oriented to person, place, and time. He appears well-developed and well-nourished.   HENT:   Head: Normocephalic and atraumatic.   Eyes: Conjunctivae are normal.   Cardiovascular: Normal rate.    Pulmonary/Chest: Effort normal. No respiratory distress.   Abdominal: Soft. He exhibits distension.   Incisions c/d/i; appropriately tender   Genitourinary:   Genitourinary Comments: Lopez draining light pink urine   Musculoskeletal: Normal range of motion.   Neurological: He is alert and oriented to person, place, and time.   Skin: Skin is warm and dry.     Psychiatric: He has a normal mood and affect. His behavior is normal. Judgment and thought content normal.       Significant Labs:    BMP:  Recent Labs   Lab 08/07/19  0926      K 4.6      CO2 26   BUN 21   CREATININE 1.1   CALCIUM 10.0       CBC:   Recent Labs   Lab 08/07/19  0926   WBC 7.06   HGB 15.2   HCT 43.9          All pertinent labs results from the past 24 hours have been reviewed.    Significant Imaging:  All pertinent imaging results/findings from the past 24 hours have been reviewed.

## 2019-08-09 NOTE — ASSESSMENT & PLAN NOTE
70 yo M with prostate cancer s/p RALP on 8/8/19    - regular diet  - HLIV  - OOBTC/ambulate  - scheduled Toradol, Tylenol; PRN oxy  - doing well; plan for discharge today

## 2019-08-09 NOTE — DISCHARGE INSTRUCTIONS
Postoperative Instructions  No heavy lifting greater than 10 pounds or a gallon of milk  Do not strain to have a bowel movement  No strenuous exercise  You may sponge bathe  No driving while you are on narcotic pain medications or if your vasquez  catheter is in place  No showering until 48 hours after surgery or 48 hours after last drain removed.    Call the doctor if:   Temperature is greater than 101F   Persistent vomiting and inability to keep food down   Inability to pee    If you have a drain, please record the output and color and bring sheet with you to your appointment.

## 2019-08-09 NOTE — PLAN OF CARE
LMSW met with patient at the bedside.     Patient is alert and oriented with no communication barriers.      Prior to admission patient was independent. Patient denies the use of HH or DME.     Patients PCP is correct on the face sheet. Patient choice pharmacy is [facility] but patient also uses [facility] on [address] and [place].          Patient denies a history of mental illness.     Patient will be transported home by family at discharge.     No CM needs identified at this time.    CM Team will continue to follow.        08/09/19 1014   Discharge Assessment   Assessment Type Discharge Planning Assessment   Confirmed/corrected address and phone number on facesheet? Yes   Assessment information obtained from? Patient   Communicated expected length of stay with patient/caregiver no   Prior to hospitilization cognitive status: Alert/Oriented   Prior to hospitalization functional status: Independent   Current cognitive status: Alert/Oriented   Current Functional Status: Independent   Lives With spouse   Able to Return to Prior Arrangements yes   Is patient able to care for self after discharge? Yes   Patient's perception of discharge disposition home or selfcare   Readmission Within the Last 30 Days no previous admission in last 30 days   Patient currently being followed by outpatient case management? No   Patient currently receives any other outside agency services? No   Equipment Currently Used at Home none   Do you have any problems affording any of your prescribed medications? No   Is the patient taking medications as prescribed? yes   Does the patient have transportation home? Yes   Transportation Anticipated family or friend will provide   Does the patient receive services at the [facility]mad[facility] Clinic? No   Discharge Plan A Home   DME Needed Upon Discharge  none   Patient/Family in Agreement with Plan yes

## 2019-08-09 NOTE — PROGRESS NOTES
[facility]  Urology  Progress Note    Patient Name: Moshe Murphy  MRN: [id number]  Admission Date: 8/8/2019  Hospital Length of Stay: 1 days  Code Status: Prior   Attending Provider: Deep Aguilar MD   Primary Care Physician: Srinath Damon MD    Subjective:     HPI:  No notes on file    Interval History: Underwent RALP yesterday, tolerated well. Ambulating, tolerating diet, pain and nausea controlled. UOP excellent. Not yet passing flatus, no BM.     Review of Systems  Objective:     Temp:  [96.9 °F (36.1 °C)-98.2 °F (36.8 °C)] 96.9 °F (36.1 °C)  Pulse:  [53-63] 53  Resp:  [16-20] 16  SpO2:  [91 %-97 %] 97 %  BP: (110-139)/(60-73) 135/73     Body mass index is 30.42 kg/m².           Drains     Drain                 Urethral Catheter 08/08/19 0730 20 Fr. 1 day                Physical Exam   Constitutional: He is oriented to person, place, and time. He appears well-developed and well-nourished.   HENT:   Head: Normocephalic and atraumatic.   Eyes: Conjunctivae are normal.   Cardiovascular: Normal rate.    Pulmonary/Chest: Effort normal. No respiratory distress.   Abdominal: Soft. He exhibits distension.   Incisions c/d/i; appropriately tender   Genitourinary:   Genitourinary Comments: Lopez draining light pink urine   Musculoskeletal: Normal range of motion.   Neurological: He is alert and oriented to person, place, and time.   Skin: Skin is warm and dry.     Psychiatric: He has a normal mood and affect. His behavior is normal. Judgment and thought content normal.       Significant Labs:    BMP:  Recent Labs   Lab 08/07/19  0926      K 4.6      CO2 26   BUN 21   CREATININE 1.1   CALCIUM 10.0       CBC:   Recent Labs   Lab 08/07/19  0926   WBC 7.06   HGB 15.2   HCT 43.9          All pertinent labs results from the past 24 hours have been reviewed.    Significant Imaging:  All pertinent imaging results/findings from the past 24 hours have been  reviewed.                  Assessment/Plan:     * PC (prostate cancer)  70 yo M with prostate cancer s/p RALP on 8/8/19    - regular diet  - HLIV  - OOBTC/ambulate  - scheduled Toradol, Tylenol; PRN oxy  - doing well; plan for discharge today        VTE Risk Mitigation (From admission, onward)        Ordered     enoxaparin injection 40 mg  Daily      08/08/19 1308     Place sequential compression device  Until discontinued      08/08/19 1308          Errol Asencio MD  Urology  [facility]

## 2019-08-09 NOTE — PLAN OF CARE
Patient is discharged home.     No CM needs for discharge.    Patients family will transport him home.      08/09/19 1017   Final Note   Assessment Type Final Discharge Note   Anticipated Discharge Disposition Home   What phone number can be called within the next 1-3 days to see how you are doing after discharge? [phone]   Right Care Referral Info   Post Acute Recommendation No Care

## 2019-08-09 NOTE — NURSING
New orders noted for discharge, patient agreeable with discharge. Removed PIV, catheter tip intact. Dressing applied. Lopez and leg bag training done at bedside with patient, verbalized understanding. Medications reviewed, pt verbalized understanding. Will d/c home, all belongings sent with patient.

## 2019-08-09 NOTE — TELEPHONE ENCOUNTER
----- Message from Errol Asencio MD sent at 8/9/2019  9:48 AM CDT -----  Hey, can you set this patient up with a voiding trial (nursing visit only) next Friday (8/16)?    Thanks,  Errol Asencio

## 2019-08-13 ENCOUNTER — PATIENT OUTREACH (OUTPATIENT)
Dept: ADMINISTRATIVE | Facility: CLINIC | Age: 69
End: 2019-08-13

## 2019-08-13 NOTE — PATIENT INSTRUCTIONS
Prostate Cancer: Radical Prostatectomy     The prostate, seminal vesicles, and a portion of the urethra are removed.   Radical prostatectomy is surgery to remove the entire prostate. It may be done if tests show that the cancer is confined to the prostate. Your surgeon will give you detailed instructions on getting ready for surgery. After surgery, youll be told how to care for yourself at home as you recover. Be sure to ask any questions you have about the procedure and recovery.  Risks and possible complications  All surgeries have risks. The risks of this surgery include:  · Blood clots  · Excess bleeding  · Hole (perforation) in the bowel  · Infection  · Loss of bladder control (incontinence)  · Lung infection (pneumonia)  · Trouble getting or keeping an erection (erectile dysfunction)  · Trouble urinating  Getting ready for your surgery     The urethra is reattached to the bladder. A catheter is inserted to drain urine while you heal. A balloon holds the catheter in place.   Follow all instructions from your healthcare team. In addition:  · Tell your healthcare provider about all medicines you take. This includes herbs and other supplements. It also includes any blood thinners, such as warfarin, clopidogrel, or daily aspirin. You may need to stop taking some or all of them before the surgery.  · You may be told to use a laxative, enemas, or both before the surgery. This is to empty the colon and rectum of stool. Follow the instructions you are given.  · Dont eat or drink after midnight the night before surgery.  How the surgery is done  The surgery may be done through several small incisions in the abdomen. This is called laparoscopy. In many cases, a method called robotic-assisted laparoscopy is used. The robotic system helps during the surgery. It gives a 3-D view of inside the body. It also assists the surgeons hand movements.   In some cases, the surgery may be performed through a larger incision in  the abdomen. This is called the retropubic approach. Or surgery may be done through an incision behind the scrotum. This is called the perineal approach.  During the surgery:  · The surgeon may remove and check the lymph nodes near the prostate. This is to see if cancer has spread. If the cancer has spread, the surgeon may decide not to remove the prostate.  · The prostate, seminal vesicles, and a portion of urethra will then be removed.  · Nerve-sparing methods may be used to try to preserve erectile function.  After surgery  You will have a catheter in place to drain urine from your bladder. Urine will flow through the catheter into a sterile bag. The urine may be bloody or cloudy at first. You may go home in 1 to 3 days.  Recovering at home  Youll be given medicines to control pain. The catheter will be left in place when you go home. Youll be given instructions on how to manage it.  Follow-up care  The catheter and stitches will be removed at a follow-up visit. This is often 1 to 2 weeks after surgery. Bladder control often takes a few weeks to several months to return. Improvement can continue for up to a year.  When to call your healthcare provider  Call your healthcare provider right away if you have any of the following:  · Chills  · Fever of 100.4°F (38°C) or higher, or as directed by your healthcare provider  · Fluid leaking from your incision  · Redness or pain in the incision that gets worse  · Swelling of your leg or ankle  · Trouble urinating after the catheter has been removed  · Urine not draining from the catheter   Date Last Reviewed: 5/1/2017 © 2000-2017 [employer]. [address] [place] [address], [place] [address]. All rights reserved. This information is not intended as a substitute for professional medical care. Always follow your healthcare professional's instructions.

## 2019-08-15 ENCOUNTER — OFFICE VISIT (OUTPATIENT)
Dept: UROLOGY | Facility: CLINIC | Age: 69
End: 2019-08-15
Payer: MEDICARE

## 2019-08-15 VITALS
SYSTOLIC BLOOD PRESSURE: 142 MMHG | HEART RATE: 68 BPM | BODY MASS INDEX: 30.35 KG/M2 | DIASTOLIC BLOOD PRESSURE: 83 MMHG | WEIGHT: 212 LBS | HEIGHT: 70 IN

## 2019-08-15 DIAGNOSIS — C61 PC (PROSTATE CANCER): Primary | ICD-10-CM

## 2019-08-15 PROCEDURE — 99024 PR POST-OP FOLLOW-UP VISIT: ICD-10-PCS | Mod: S$GLB,,, | Performed by: UROLOGY

## 2019-08-15 PROCEDURE — 99024 POSTOP FOLLOW-UP VISIT: CPT | Mod: S$GLB,,, | Performed by: UROLOGY

## 2019-08-15 RX ORDER — NITROGLYCERIN 0.3 MG/1
TABLET SUBLINGUAL
COMMUNITY
Start: 2013-11-16 | End: 2019-09-24

## 2019-08-15 RX ORDER — CIPROFLOXACIN 500 MG/1
TABLET ORAL
Refills: 0 | COMMUNITY
Start: 2019-06-30 | End: 2019-08-15

## 2019-08-15 RX ORDER — HYDROMORPHONE HYDROCHLORIDE 2 MG/1
TABLET ORAL
Refills: 0 | COMMUNITY
Start: 2019-07-09 | End: 2019-08-15

## 2019-08-15 RX ORDER — ALPRAZOLAM 0.5 MG/1
TABLET ORAL
Refills: 0 | COMMUNITY
Start: 2019-06-30 | End: 2019-08-15

## 2019-08-15 NOTE — PROGRESS NOTES
"Subjective:      Moshe Murphy is a 69 y.o. male who returns today regarding his     Doing great 1 week status post robotic prostatectomy and lymphadenectomy.  Here for voiding trial.    The following portions of the patient's history were reviewed and updated as appropriate: allergies, current medications, past family history, past medical history, past social history, past surgical history and problem list.    Review of Systems  Pertinent items are noted in HPI.  A comprehensive multipoint review of systems was negative except as otherwise stated in the HPI.     Objective:   Vitals: BP (!) 142/83 (BP Location: Left arm, Patient Position: Sitting, BP Method: X-Large (Automatic))   Pulse 68   Ht 5' 10" (1.778 m)   Wt 96.2 kg (212 lb)   BMI 30.42 kg/m²     Physical Exam   General: alert and oriented, no acute distress  Respiratory: Symmetric expansion, non-labored breathing  Cardiovascular: normal to inspection  Abdomen: non distended   Skin: normal coloration and turgor, no rashes, no suspicious skin lesions noted  Neuro: no gross deficits  Psych: normal judgment and insight, normal mood/affect and non-anxious    Physical Exam    Lab Review   Urinalysis demonstrates   Lab Results   Component Value Date    WBC 7.06 08/07/2019    HGB 15.2 08/07/2019    HCT 43.9 08/07/2019    MCV 91 08/07/2019     08/07/2019     Lab Results   Component Value Date    CREATININE 1.1 08/07/2019    BUN 21 08/07/2019     SPECIMEN  1) Right base margin  2) Right pelvic lymph nodes  3) Left pelvic lymph nodes  4) Prostate, Radical Prostatectomy  FINAL PATHOLOGIC DIAGNOSIS  1. FRAGMENT OF FIBROADIPOSE TISSUE AND PERIPHERAL NERVE, NO MALIGNANCY IDENTIFIED.  2. NINE RIGHT PELVIC LYMPH NODES WITH NO EVIDENCE OF METASTATIC CARCINOMA IDENTIFIED  (0/9).  3. TWO BENIGN LEFT PELVIC LYMPH NODES WITH NO EVIDENCE OF METASTATIC CARCINOMA  IDENTIFIED (0/2).  4. RADICAL PROSTATECTOMY SPECIMEN SHOWING A FOCAL AREA OF MODERATELY " DIFFERENTIATED  ADENOCARCINOMA WITH NEGATIVE MARGINS. SEE SYNOPTIC REPORT:  PROCEDURE: RADICAL PROSTATECTOMY  HISTOLOGIC TYPE: ACINAR ADENOCARCINOMA  HISTOLOGIC GRADE: GRADE GROUP 1 (SHANNA SCORE 3+3 = 6)  EXTRAPROSTATIC EXTENSION: NOT IDENTIFIED  URINARY BLADDER NECK INVASION: NOT IDENTIFIED  SEMINAL VESICLE INVASION: NOT IDENTIFIED  PERINEURAL INVASION: PRESENT, FOCAL  MARGINS: TUMOR DOES NOT INVOLVE THE OVERLYING INKED SURFACE. ALSO, RIGHT BASE MARGIN  IS NEGATIVE (SEE SPECIMEN 1.)  TREATMENT EFFECT: NO KNOWN PRESURGICAL THERAPY  LYMPH NODES: RIGHT AND LEFT PELVIC LYMPH NODES ARE BENIGN (SEE ABOVE)  TUMOR STAGE: pT2 N0  Diagnosed by: Aidan Hunter M.D.  (Electronically Signed: 2019-08-13 09:27:03)  Imaging  -  Assessment and Plan:   PC (prostate cancer)  Shanna 6 [id number]      Voiding trial successful.  Patient voided 150 cc without difficulty  Kegel exercise  Return to clinic 1 month with PSA to see me    Follow-up with Dr. Haynes in 3 months with PSA; we will return urologic care to Dr. Haynes at that time

## 2019-09-09 ENCOUNTER — TELEPHONE (OUTPATIENT)
Dept: UROLOGY | Facility: CLINIC | Age: 69
End: 2019-09-09

## 2019-09-09 NOTE — TELEPHONE ENCOUNTER
----- Message from Nessa Reyes sent at 9/9/2019  3:14 PM CDT -----  Contact: REMIGIO CABRAL [[id number]]   Name of Who is Calling: REMIGIO CABRAL [[id number]]    What is the request in detail: patient request call back in reference to doing activity  Please contact to further discuss and advise      Can the clinic reply by [facility]: no     What Number to Call Back if not in [facility]:  [phone]

## 2019-09-09 NOTE — TELEPHONE ENCOUNTER
Spoke with , who stated that he's getting bored at home. Per pt he would like to know if he can golf post robotic prostatectomy. Please advise.

## 2019-09-18 ENCOUNTER — LAB VISIT (OUTPATIENT)
Dept: LAB | Facility: OTHER | Age: 69
End: 2019-09-18
Attending: UROLOGY
Payer: MEDICARE

## 2019-09-18 DIAGNOSIS — C61 PC (PROSTATE CANCER): ICD-10-CM

## 2019-09-18 LAB — COMPLEXED PSA SERPL-MCNC: <0.01 NG/ML (ref 0–4)

## 2019-09-18 PROCEDURE — 84153 ASSAY OF PSA TOTAL: CPT

## 2019-09-18 PROCEDURE — 36415 COLL VENOUS BLD VENIPUNCTURE: CPT

## 2019-09-24 ENCOUNTER — OFFICE VISIT (OUTPATIENT)
Dept: UROLOGY | Facility: CLINIC | Age: 69
End: 2019-09-24
Payer: MEDICARE

## 2019-09-24 VITALS
WEIGHT: 212 LBS | DIASTOLIC BLOOD PRESSURE: 79 MMHG | HEIGHT: 70 IN | BODY MASS INDEX: 30.35 KG/M2 | SYSTOLIC BLOOD PRESSURE: 128 MMHG | HEART RATE: 89 BPM

## 2019-09-24 DIAGNOSIS — C61 PC (PROSTATE CANCER): Primary | ICD-10-CM

## 2019-09-24 DIAGNOSIS — N52.9 ERECTILE DYSFUNCTION, UNSPECIFIED ERECTILE DYSFUNCTION TYPE: ICD-10-CM

## 2019-09-24 PROCEDURE — 99024 PR POST-OP FOLLOW-UP VISIT: ICD-10-PCS | Mod: S$GLB,,, | Performed by: UROLOGY

## 2019-09-24 PROCEDURE — 99024 POSTOP FOLLOW-UP VISIT: CPT | Mod: S$GLB,,, | Performed by: UROLOGY

## 2019-09-24 RX ORDER — TADALAFIL 20 MG/1
20 TABLET ORAL DAILY PRN
Qty: 12 TABLET | Refills: 11 | Status: SHIPPED | OUTPATIENT
Start: 2019-09-24 | End: 2019-10-15 | Stop reason: SDUPTHER

## 2019-09-24 NOTE — PROGRESS NOTES
"Subjective:      Moshe Murphy is a 69 y.o. male who returns today s/p robotic prostatectomy and lymphadenectomy on 8/8.    He is here for PSA and FU.    He reports nocturia 2-3x per night, occasional MORENA when coughing and sneezing but he is otherwise dry.   He has been able stop wearing pads.  Almost completely dry    He occasionally feels a pain in the rectum and head of the penis unrelated to urination.    No erections yet.  He requests Cialis generic        The following portions of the patient's history were reviewed and updated as appropriate: allergies, current medications, past family history, past medical history, past social history, past surgical history and problem list.    Review of Systems  Pertinent items are noted in HPI.  A comprehensive multipoint review of systems was negative except as otherwise stated in the HPI.     Objective:   Vitals: /79   Pulse 89   Ht 5' 10" (1.778 m)   Wt 96.2 kg (212 lb)   BMI 30.42 kg/m²     Physical Exam   General: alert and oriented, no acute distress  Respiratory: Symmetric expansion, non-labored breathing  Cardiovascular: regular rate and rhythm, no peripheral edema  Abdomen: soft, non distended  m:[id number]::"normal coloration and turgor, no rashes, no suspicious skin lesions noted"}  Neuro: no gross deficits  Psych: normal judgment and insight, normal mood/affect and non-anxious    Lab Review   Urinalysis demonstrates positive for leukocytes, red blood cells -- Neg on microscopic   Lab Results   Component Value Date    WBC 7.06 08/07/2019    HGB 15.2 08/07/2019    HCT 43.9 08/07/2019    MCV 91 08/07/2019     08/07/2019     Lab Results   Component Value Date    CREATININE 1.1 08/07/2019    BUN 21 08/07/2019     Lab Results   Component Value Date    PSA 1.0 07/29/2005    PSADIAG <0.01 09/18/2019       SPECIMEN  1) Right base margin  2) Right pelvic lymph nodes  3) Left pelvic lymph nodes  4) Prostate, Radical Prostatectomy  FINAL PATHOLOGIC " DIAGNOSIS  1. FRAGMENT OF FIBROADIPOSE TISSUE AND PERIPHERAL NERVE, NO MALIGNANCY IDENTIFIED.  2. NINE RIGHT PELVIC LYMPH NODES WITH NO EVIDENCE OF METASTATIC CARCINOMA IDENTIFIED  (0/9).  3. TWO BENIGN LEFT PELVIC LYMPH NODES WITH NO EVIDENCE OF METASTATIC CARCINOMA  IDENTIFIED (0/2).  4. RADICAL PROSTATECTOMY SPECIMEN SHOWING A FOCAL AREA OF MODERATELY DIFFERENTIATED  ADENOCARCINOMA WITH NEGATIVE MARGINS. SEE SYNOPTIC REPORT:  PROCEDURE: RADICAL PROSTATECTOMY  HISTOLOGIC TYPE: ACINAR ADENOCARCINOMA  HISTOLOGIC GRADE: GRADE GROUP 1 (EDMAR SCORE 3+3 = 6)  EXTRAPROSTATIC EXTENSION: NOT IDENTIFIED  URINARY BLADDER NECK INVASION: NOT IDENTIFIED  SEMINAL VESICLE INVASION: NOT IDENTIFIED  PERINEURAL INVASION: PRESENT, FOCAL  MARGINS: TUMOR DOES NOT INVOLVE THE OVERLYING INKED SURFACE. ALSO, RIGHT BASE MARGIN  IS NEGATIVE (SEE SPECIMEN 1.)  TREATMENT EFFECT: NO KNOWN PRESURGICAL THERAPY  LYMPH NODES: RIGHT AND LEFT PELVIC LYMPH NODES ARE BENIGN (SEE ABOVE)  TUMOR STAGE: pT2 N0  Diagnosed by: Aidan Hunter M.D.  (Electronically Signed: 2019-08-13 09:27:03    Assessment and Plan:   1. PC (prostate cancer)     [place] 6 [id number]    - return urologic care to Dr. Haynes in 3 months with PSA   - ok to resume strength training     2. Erectile dysfunction    -Cialis 20 mg PRN

## 2019-10-15 ENCOUNTER — TELEPHONE (OUTPATIENT)
Dept: UROLOGY | Facility: CLINIC | Age: 69
End: 2019-10-15

## 2019-10-15 DIAGNOSIS — C61 PC (PROSTATE CANCER): ICD-10-CM

## 2019-10-15 RX ORDER — TADALAFIL 20 MG/1
20 TABLET ORAL DAILY PRN
Qty: 12 TABLET | Refills: 11 | Status: SHIPPED | OUTPATIENT
Start: 2019-10-15 | End: 2020-03-07

## 2019-10-15 NOTE — TELEPHONE ENCOUNTER
"Spoke with the pt regarding the medication prescribed, "pt stated cialis is not working, requesting another medication". Pt last seen on 9/24/19.            Thanks Roxana  "

## 2019-10-15 NOTE — TELEPHONE ENCOUNTER
----- Message from Meet Medeiros LPN sent at 10/14/2019  5:35 PM CDT -----  Contact: REMIGIO CABRAL [[id number]]      ----- Message -----  From: Floridalma Chowdhury  Sent: 10/14/2019   1:48 PM CDT  To: Jeff Adame Staff    Name of Who is Calling: REMIGIO CABRAL [[id number]]    What is the request in detail: Would like to speak with staff in regards to medication prescribed after procedure. Please contact to further discuss and advise      Can the clinic reply by [facility]: no    What Number to Call Back if not in [facility]: [phone]

## 2020-10-13 ENCOUNTER — OFFICE VISIT (OUTPATIENT)
Dept: UROLOGY | Facility: CLINIC | Age: 70
End: 2020-10-13
Payer: MEDICARE

## 2020-10-13 VITALS
BODY MASS INDEX: 30.35 KG/M2 | HEIGHT: 70 IN | HEART RATE: 55 BPM | SYSTOLIC BLOOD PRESSURE: 139 MMHG | WEIGHT: 212 LBS | DIASTOLIC BLOOD PRESSURE: 81 MMHG

## 2020-10-13 DIAGNOSIS — C61 PC (PROSTATE CANCER): Primary | ICD-10-CM

## 2020-10-13 DIAGNOSIS — N32.81 OAB (OVERACTIVE BLADDER): ICD-10-CM

## 2020-10-13 DIAGNOSIS — N52.9 ERECTILE DYSFUNCTION, UNSPECIFIED ERECTILE DYSFUNCTION TYPE: ICD-10-CM

## 2020-10-13 PROCEDURE — 1101F PR PT FALLS ASSESS DOC 0-1 FALLS W/OUT INJ PAST YR: ICD-10-PCS | Mod: CPTII,S$GLB,, | Performed by: UROLOGY

## 2020-10-13 PROCEDURE — 3008F BODY MASS INDEX DOCD: CPT | Mod: CPTII,S$GLB,, | Performed by: UROLOGY

## 2020-10-13 PROCEDURE — 1159F MED LIST DOCD IN RCRD: CPT | Mod: S$GLB,,, | Performed by: UROLOGY

## 2020-10-13 PROCEDURE — 3008F PR BODY MASS INDEX (BMI) DOCUMENTED: ICD-10-PCS | Mod: CPTII,S$GLB,, | Performed by: UROLOGY

## 2020-10-13 PROCEDURE — 3075F PR MOST RECENT SYSTOLIC BLOOD PRESS GE 130-139MM HG: ICD-10-PCS | Mod: CPTII,S$GLB,, | Performed by: UROLOGY

## 2020-10-13 PROCEDURE — 1101F PT FALLS ASSESS-DOCD LE1/YR: CPT | Mod: CPTII,S$GLB,, | Performed by: UROLOGY

## 2020-10-13 PROCEDURE — 1126F PR PAIN SEVERITY QUANTIFIED, NO PAIN PRESENT: ICD-10-PCS | Mod: S$GLB,,, | Performed by: UROLOGY

## 2020-10-13 PROCEDURE — 99214 PR OFFICE/OUTPT VISIT, EST, LEVL IV, 30-39 MIN: ICD-10-PCS | Mod: S$GLB,,, | Performed by: UROLOGY

## 2020-10-13 PROCEDURE — 1159F PR MEDICATION LIST DOCUMENTED IN MEDICAL RECORD: ICD-10-PCS | Mod: S$GLB,,, | Performed by: UROLOGY

## 2020-10-13 PROCEDURE — 3079F PR MOST RECENT DIASTOLIC BLOOD PRESSURE 80-89 MM HG: ICD-10-PCS | Mod: CPTII,S$GLB,, | Performed by: UROLOGY

## 2020-10-13 PROCEDURE — 3079F DIAST BP 80-89 MM HG: CPT | Mod: CPTII,S$GLB,, | Performed by: UROLOGY

## 2020-10-13 PROCEDURE — 3075F SYST BP GE 130 - 139MM HG: CPT | Mod: CPTII,S$GLB,, | Performed by: UROLOGY

## 2020-10-13 PROCEDURE — 99214 OFFICE O/P EST MOD 30 MIN: CPT | Mod: S$GLB,,, | Performed by: UROLOGY

## 2020-10-13 PROCEDURE — 1126F AMNT PAIN NOTED NONE PRSNT: CPT | Mod: S$GLB,,, | Performed by: UROLOGY

## 2020-10-13 RX ORDER — MELOXICAM 7.5 MG/1
TABLET ORAL
COMMUNITY
Start: 2020-07-14

## 2020-10-13 RX ORDER — BACLOFEN 10 MG/1
TABLET ORAL
COMMUNITY
Start: 2020-07-14

## 2020-10-13 RX ORDER — LEVOTHYROXINE SODIUM 25 UG/1
TABLET ORAL
COMMUNITY
Start: 2020-09-01

## 2020-10-13 RX ORDER — GABAPENTIN 100 MG/1
CAPSULE ORAL
COMMUNITY
Start: 2020-08-25

## 2020-10-13 NOTE — PROGRESS NOTES
"Subjective:      Moshe Murphy is a 70 y.o. male who returns today regarding his     Post robotic prostatectomy and lymphadenectomy.  We transitioned his care back to Dr. Haynes last year    He now self refers for urinary frequency.    Strong stream  Empties well    No hematuria    Voiding every 2 hours  No dysuria    No incontinence    The following portions of the patient's history were reviewed and updated as appropriate: allergies, current medications, past family history, past medical history, past social history, past surgical history and problem list.    Review of Systems  Pertinent items are noted in HPI.  A comprehensive multipoint review of systems was negative except as otherwise stated in the HPI.     Objective:   Vitals: /81 (BP Location: Right arm, Patient Position: Sitting, BP Method: Large (Automatic))   Pulse (!) 55   Ht 5' 10" (1.778 m)   Wt 96.2 kg (212 lb)   BMI 30.42 kg/m²     Physical Exam   General: alert and oriented, no acute distress  Respiratory: Symmetric expansion, non-labored breathing  Cardiovascular: normal to inspection  Abdomen: non distended   Skin: normal coloration and turgor, no rashes, no suspicious skin lesions noted  Neuro: no gross deficits  Psych: normal judgment and insight, normal mood/affect and non-anxious    Physical Exam    Lab Review   Urinalysis demonstrates unable to give specimen    Lab Results   Component Value Date    WBC 7.06 08/07/2019    HGB 15.2 08/07/2019    HCT 43.9 08/07/2019    MCV 91 08/07/2019     08/07/2019     Lab Results   Component Value Date    CREATININE 1.1 08/07/2019    BUN 21 08/07/2019     Lab Results   Component Value Date    PSA 1.0 07/29/2005    PSA 0.9 06/23/2004    PSADIAG <0.01 09/18/2019       Imaging  -PVR 69cc    Assessment and Plan:   PC (prostate cancer)   [place] 6 [id number]      Erectile dysfunction, unspecified erectile dysfunction type  Cont cialis  Add CHI      OAB   [email or web address]     rtc 3 months to see Mia " Walter NP; check UA and PVR

## 2021-09-17 NOTE — TELEPHONE ENCOUNTER
I called the patient. He would like a refill Cialis.  He will also get a vacuum erection device.  He is scheduled to see Dr. Haynes in December.  If his erections have not improved by that time he will discuss injections with Dr. Haynes   Tazorac Counseling:  Patient advised that medication is irritating and drying.  Patient may need to apply sparingly and wash off after an hour before eventually leaving it on overnight.  The patient verbalized understanding of the proper use and possible adverse effects of tazorac.  All of the patient's questions and concerns were addressed.

## 2021-10-20 DIAGNOSIS — M25.562 ACUTE PAIN OF LEFT KNEE: Primary | ICD-10-CM

## 2021-10-20 RX ORDER — HYDROCODONE BITARTRATE AND ACETAMINOPHEN 5; 325 MG/1; MG/1
1 TABLET ORAL EVERY 6 HOURS PRN
Qty: 20 TABLET | Refills: 0 | Status: SHIPPED | OUTPATIENT
Start: 2021-10-20 | End: 2021-10-20

## 2021-10-20 RX ORDER — HYDROCODONE BITARTRATE AND ACETAMINOPHEN 5; 325 MG/1; MG/1
1 TABLET ORAL EVERY 6 HOURS PRN
Qty: 20 TABLET | Refills: 0 | Status: SHIPPED | OUTPATIENT
Start: 2021-10-20

## 2025-06-09 ENCOUNTER — TELEPHONE (OUTPATIENT)
Dept: SPORTS MEDICINE | Facility: CLINIC | Age: 75
End: 2025-06-09
Payer: MEDICARE

## 2025-06-18 ENCOUNTER — HOSPITAL ENCOUNTER (OUTPATIENT)
Dept: RADIOLOGY | Facility: HOSPITAL | Age: 75
Discharge: HOME | End: 2025-06-18
Attending: ORTHOPAEDIC SURGERY
Payer: MEDICARE

## 2025-06-18 ENCOUNTER — OFFICE VISIT (OUTPATIENT)
Dept: SPORTS MEDICINE | Facility: CLINIC | Age: 75
End: 2025-06-18
Payer: MEDICARE

## 2025-06-18 VITALS
HEART RATE: 76 BPM | WEIGHT: 212.06 LBS | BODY MASS INDEX: 30.36 KG/M2 | SYSTOLIC BLOOD PRESSURE: 119 MMHG | HEIGHT: 70 IN | DIASTOLIC BLOOD PRESSURE: 74 MMHG

## 2025-06-18 DIAGNOSIS — M25.512 LEFT SHOULDER PAIN, UNSPECIFIED CHRONICITY: Primary | ICD-10-CM

## 2025-06-18 DIAGNOSIS — M25.512 LEFT SHOULDER PAIN, UNSPECIFIED CHRONICITY: ICD-10-CM

## 2025-06-18 PROCEDURE — 73030 X-RAY EXAM OF SHOULDER: CPT | Mod: 26,LT,, | Performed by: RADIOLOGY

## 2025-06-18 PROCEDURE — 3074F SYST BP LT 130 MM HG: CPT | Mod: CPTII,S$GLB,, | Performed by: ORTHOPAEDIC SURGERY

## 2025-06-18 PROCEDURE — 3078F DIAST BP <80 MM HG: CPT | Mod: CPTII,S$GLB,, | Performed by: ORTHOPAEDIC SURGERY

## 2025-06-18 PROCEDURE — 1159F MED LIST DOCD IN RCRD: CPT | Mod: CPTII,S$GLB,, | Performed by: ORTHOPAEDIC SURGERY

## 2025-06-18 PROCEDURE — 99204 OFFICE O/P NEW MOD 45 MIN: CPT | Mod: S$GLB,,, | Performed by: ORTHOPAEDIC SURGERY

## 2025-06-18 PROCEDURE — 73030 X-RAY EXAM OF SHOULDER: CPT | Mod: TC,LT

## 2025-06-18 PROCEDURE — 1101F PT FALLS ASSESS-DOCD LE1/YR: CPT | Mod: CPTII,S$GLB,, | Performed by: ORTHOPAEDIC SURGERY

## 2025-06-18 PROCEDURE — 3044F HG A1C LEVEL LT 7.0%: CPT | Mod: CPTII,S$GLB,, | Performed by: ORTHOPAEDIC SURGERY

## 2025-06-18 PROCEDURE — 1125F AMNT PAIN NOTED PAIN PRSNT: CPT | Mod: CPTII,S$GLB,, | Performed by: ORTHOPAEDIC SURGERY

## 2025-06-18 PROCEDURE — 99999 PR PBB SHADOW E&M-EST. PATIENT-LVL III: CPT | Mod: PBBFAC,,, | Performed by: ORTHOPAEDIC SURGERY

## 2025-06-18 PROCEDURE — 3288F FALL RISK ASSESSMENT DOCD: CPT | Mod: CPTII,S$GLB,, | Performed by: ORTHOPAEDIC SURGERY

## 2025-06-18 NOTE — PROGRESS NOTES
CC: left Shoulder pain    74 y.o. Male reports left shoulder pain that has been present for several years. He reports he recently had lumbar laminectomy performed in January which was complicated by a dural leak and lower extremity weakness. He presents today in a wheelchair. He states he has a history of being a power  and . Had a cortisone injection in January 2025 with ~ 1 month of relief. However, due to havign to support himself with his arms as his legs are weak, he feels he has aggravated his shoulder.     He reports that the pain is worse with overhead activity. It also bothers him at night.    He is going to PT at [facility]        PAST MEDICAL HISTORY:   Past Medical History:   Diagnosis Date    Acid reflux     Arthritis     Coronary artery disease     2 stents    History of right hip replacement 2016    Hyperlipidemia     Hypertension     Pancreatitis     Prostate cancer      PAST SURGICAL HISTORY:   Past Surgical History:   Procedure Laterality Date    CARDIAC SURGERY      angioplaty    ELBOW SURGERY      HIP REPLACEMENT ARTHROPLASTY  2016    JOINT REPLACEMENT      right hip    KNEE ARTHROSCOPY      ROBOT-ASSISTED LAPAROSCOPIC LYMPHADENECTOMY USING DA ERLINDA XI Bilateral 8/8/2019    Procedure: XI ROBOTIC LYMPHADENECTOMY;  Surgeon: Deep Aguilar MD;  Location: [facility];  Service: Urology;  Laterality: Bilateral;    ROBOT-ASSISTED LAPAROSCOPIC PROSTATECTOMY N/A 8/8/2019    Procedure: ROBOTIC PROSTATECTOMY;  Surgeon: Deep Aguilar MD;  Location: [facility] OR;  Service: Urology;  Laterality: N/A;    SHOULDER SURGERY      stents       FAMILY HISTORY:   Family History   Problem Relation Name Age of Onset    Heart disease Mother      Stroke Mother      Diabetes Neg Hx      Glaucoma Neg Hx      Macular degeneration Neg Hx       SOCIAL HISTORY: Social History[1]    MEDICATIONS: Current Medications[2]  ALLERGIES: Review of patient's allergies indicates:  No Known Allergies    VITAL  "SIGNS: /74   Pulse 76   Ht 5' 10" (1.778 m)   Wt 96.2 kg (212 lb 1.3 oz)   BMI 30.43 kg/m²      Review of Systems   Constitution: Negative. Negative for chills, fever and night sweats.   HENT: Negative for congestion and headaches.    Eyes: Negative for blurred vision, left vision loss and right vision loss.   Cardiovascular: Negative for chest pain and syncope.   Respiratory: Negative for cough and shortness of breath.    Endocrine: Negative for polydipsia, polyphagia and polyuria.   Hematologic/Lymphatic: Negative for bleeding problem. Does not bruise/bleed easily.   Skin: Negative for dry skin, itching and rash.   Musculoskeletal: Negative for falls and muscle weakness.   Gastrointestinal: Negative for abdominal pain and bowel incontinence.   Genitourinary: Negative for bladder incontinence and nocturia.   Neurological: Negative for disturbances in coordination, loss of balance and seizures.   Psychiatric/Behavioral: Negative for depression. The patient does not have insomnia.    Allergic/Immunologic: Negative for hives and persistent infections.       PHYSICAL EXAMINATION:  General:  The patient is alert and oriented x 3.  Mood is pleasant.  Observation of ears, eyes and nose reveal no gross abnormalities.  HEENT: NCAT, sclera nonicteric  Lungs: Respirations are equal and unlabored.  Gait is coordinated. Patient can toe walk and heel walk without difficulty.  Cardiovascular: There are no swelling or varicosities present.   Lymphatic: Negative for adenopathy       left SHOULDER / UPPER EXTREMITY EXAM    OBSERVATION:     Swelling  none  Deformity  none   Discoloration  none   Scapular winging none   Scars   none  Atrophy  none    TENDERNESS / CREPITUS (T/C):          T/C      T/C   Clavicle   -/-  SUPRAspinatus    -/-   AC Jt.    -/-  INFRAspinatus  -/-   SC Jt.    -/-  Deltoid    -/-   G. Tuberosity  -/-  LH BICEP groove  -/-   Acromion:  -/-  Midline Neck   -/-   Scapular " Spine -/-  Trapezium   -/-   SMA Scapula  -/-  GH jt. line - post  -/-   Scapulothoracic  -/-         ROM: (* = with pain)  Right shoulder   Left shoulder        AROM (PROM)   AROM (PROM)   FE    170° (175°)     170° (175°)     ER at 0°    60°  (65°)    60°  (65°)   ER at 90° ABD  90°  (90°)    90°  (90°)   IR at 90°  ABD   NA  (40°)     NA  (40°)      IR (spine level)   T10     T10    STRENGTH: (* = with pain) RIGHT SHOULDER  LEFT SHOULDER   SCAPTION at 0   5/5    5-/5*   SCAPTION at 30   5/5    5/5*    IR    5/5    5/5   ER    5/5    5-/5*   BICEPS   5/5    5/5   Deltoid    5/5    5/5     SIGNS:  Painful side       NEER   +    OLUANS  +   HODGE   +    SPEEDS  +   DROP ARM   neg   BELLY PRESS Neg   Superior escape none    LIFT-OFF  Neg   X-Body ADD    neg    MOVING VALGUS Neg      STABILITY TESTING    RIGHT SHOULDER   LEFT SHOULDER       Translation    Anterior  up face     up face    Posterior  up face    up face    Sulcus   < 10mm    < 10 mm    Signs    Apprehension   neg      Neg    Relocation   no change     no change    Jerk test  neg     Neg      EXTREMITY NEURO-VASCULAR EXAM    Sensation grossly intact to light touch all dermatomal regions.    DTR 2+ Biceps, Triceps, BR and Negative Jaes sign   Grossly intact motor function at Elbow, Wrist and Hand   Distal pulses radial and ulnar 2+, brisk cap refill, symmetric.      NECK:  Painless FROM and spinous processes non-tender. Negative Spurlings sign.      OTHER FINDINGS:    XRAYS:  Shoulder trauma series left,  were reviewed and interpreted by me. No convincing fracture or dislocation is noted. The osseous structures appear well mineralized and well aligned. Severe glenohumeral joint arthritis.    1. Shoulder pain, left     Plan:       ASSESSMENT:  left shoulder rotator cuff arthropathy    Cortisone injection to the subacromial space provided today  Follow up as needed             [1]   Social History  Socioeconomic History    Marital status:     Tobacco Use    Smoking status: Never    Smokeless tobacco: Never   Substance and Sexual Activity    Alcohol use: Yes     Comment: socially    Drug use: No    Sexual activity: Yes     Partners: Female     Social Drivers of Health     Financial Resource Strain: Low Risk  (5/19/2025)    Received from [facility]    Overall Financial Resource Strain (CARDIA)     Difficulty of Paying Living Expenses: Not hard at all   Food Insecurity: No Food Insecurity (5/19/2025)    Received from [facility]    Hunger Vital Sign     Worried About Running Out of Food in the Last Year: Never true     Ran Out of Food in the Last Year: Never true   Transportation Needs: No Transportation Needs (5/19/2025)    Received from [facility]    PRAPARE - Transportation     Lack of Transportation (Medical): No     Lack of Transportation (Non-Medical): No   Physical Activity: Sufficiently Active (5/20/2025)    Received from [facility]    Exercise Vital Sign     Days of Exercise per Week: 5 days     Minutes of Exercise per Session: 30 min   Stress: Stress Concern Present (5/19/2025)    Received from [facility]    [employer] [place] [employer] - Occupational Stress Questionnaire     Feeling of Stress : Very much   Housing Stability: Low Risk  (5/19/2025)    Received from [facility]    Housing Stability Vital Sign     Unable to Pay for Housing in the Last Year: No     Number of Times Moved in the Last Year: 0     Homeless in the Last Year: No   [2]   Current Outpatient Medications:     aspirin 81 MG Chew, Take 81 mg by mouth once daily. Stopped one week ago, Disp: , Rfl:     levothyroxine (SYNTHROID) 25 MCG tablet, , Disp: , Rfl:     losartan (COZAAR) 100 MG tablet, Take 100 mg by mouth once daily., Disp: , Rfl: 6    omeprazole (PRILOSEC) 20 MG capsule, , Disp: , Rfl:     atorvastatin (LIPITOR) 20 MG tablet, 20 mg once daily.  (Patient not taking: Reported on 6/18/2025), Disp: , Rfl:     baclofen (LIORESAL) 10 MG tablet, TK 1 T  PO BID (Patient not taking: Reported on 6/18/2025), Disp: , Rfl:     gabapentin (NEURONTIN) 100 MG capsule, TK ONE C PO  TID FOR 14 DAYS (Patient not taking: Reported on 6/18/2025), Disp: , Rfl:     HYDROcodone-acetaminophen (NORCO) 5-325 mg per tablet, Take 1 tablet by mouth every 6 (six) hours as needed for Pain. (Patient not taking: Reported on 6/18/2025), Disp: 20 tablet, Rfl: 0    levothyroxine (SYNTHROID) 50 MCG tablet, Take 25 mcg by mouth once daily. (Patient not taking: Reported on 6/18/2025), Disp: , Rfl: 1    meloxicam (MOBIC) 7.5 MG tablet, TK 1 T PO BID (Patient not taking: Reported on 6/18/2025), Disp: , Rfl:     mirabegron (MYRBETRIQ) 25 mg Tb24 ER tablet, Take 1 tablet (25 mg total) by mouth once daily., Disp: 30 tablet, Rfl: 11    tadalafil (CIALIS) 20 MG Tab, Take 1 tablet (20 mg total) by mouth daily as needed., Disp: 12 tablet, Rfl: 11    vitamin D (VITAMIN D3) 1000 units Tab, Take 1,000 Units by mouth once daily. Stopped one week ago (Patient not taking: Reported on 6/18/2025), Disp: , Rfl:

## (undated) DEVICE — DEVICE ANC SW STAT FOLEY 6-24

## (undated) DEVICE — SEAL UNIVERSAL 5MM-8MM XI

## (undated) DEVICE — SET TRI-LUMEN FILTERED TUBE

## (undated) DEVICE — SUT MONOCRYL PLUS 2-0 UR-6

## (undated) DEVICE — NDL INSUF ULTRA VERESS 120MM

## (undated) DEVICE — DRAPE ARM DAVINCI XI

## (undated) DEVICE — OBTURATOR 8MM BLADELESS

## (undated) DEVICE — SUT PDS II 1 CT VIL MONO 36

## (undated) DEVICE — Device

## (undated) DEVICE — SEE MEDLINE ITEM 156923

## (undated) DEVICE — DRAPE COLUMN DAVINCI XI

## (undated) DEVICE — STAPLER SKIN ROTATING HEAD

## (undated) DEVICE — DRESSING TELFA N ADH 3X8

## (undated) DEVICE — SOL WATER STRL IRR 1000ML

## (undated) DEVICE — SPONGE SURGIFOAM 100 8.5X12X10

## (undated) DEVICE — ADHESIVE DERMABOND ADVANCED

## (undated) DEVICE — SEE MEDLINE ITEM 152622

## (undated) DEVICE — BLADE SURG STAINLESS STEEL #15

## (undated) DEVICE — GLOVE BIOGEL SKINSENSE PI 7.0

## (undated) DEVICE — SCISSOR 5MMX35CM DIRECT DRIVE

## (undated) DEVICE — CLIP HEMO-LOK MLX LARGE LF

## (undated) DEVICE — SOL ELECTROLUBE ANTI-STIC

## (undated) DEVICE — SUT MONOCRYL 2-0 UR6 UNDYED

## (undated) DEVICE — SUT ABS CLIP LAPRA-TY CTD

## (undated) DEVICE — KIT WING PAD POSITIONING

## (undated) DEVICE — TROCAR ENDOPATH XCEL 12X100MM

## (undated) DEVICE — CLIPPER BLADE MOD 4406 (CAREF)

## (undated) DEVICE — JELLY SURGILUBE 5GR

## (undated) DEVICE — SOL CLEARIFY VISUALIZATION LAP

## (undated) DEVICE — SYR 50ML CATH TIP

## (undated) DEVICE — KIT ROBOTIC 4 ARM DA VINCI SI

## (undated) DEVICE — PORT ACCESS 5MM W/120MM

## (undated) DEVICE — DRESSING TRANS 4X4 TEGADERM

## (undated) DEVICE — BANDAID STRIP PLASTIC 3/4X3

## (undated) DEVICE — IRRIGATOR ENDOSCOPY DISP.

## (undated) DEVICE — SUT 0 VICRYL PLUS CT-1 27IN

## (undated) DEVICE — SUT POLY CV-6 30 TT-13

## (undated) DEVICE — SUT VICRYL+ 27 UR-6 VIOL

## (undated) DEVICE — ELECTRODE REM PLYHSV RETURN 9

## (undated) DEVICE — CONTAINER SPECIMEN STRL 4OZ

## (undated) DEVICE — TAPE MEDIPORE 3 X 10YD

## (undated) DEVICE — HEMOSTAT SURGICEL 4X8IN

## (undated) DEVICE — POSITIONER BODY WEDGE 45 DEG

## (undated) DEVICE — ELECTRODE BLADE TEFLON 6

## (undated) DEVICE — SCRUB 10% POVIDONE IODINE 4OZ

## (undated) DEVICE — BANDAGE ADHESIVE

## (undated) DEVICE — BAG URINARY DRN 2000ML

## (undated) DEVICE — SEALER VESSEL EXTEND